# Patient Record
Sex: FEMALE | Race: WHITE | NOT HISPANIC OR LATINO | Employment: FULL TIME | URBAN - METROPOLITAN AREA
[De-identification: names, ages, dates, MRNs, and addresses within clinical notes are randomized per-mention and may not be internally consistent; named-entity substitution may affect disease eponyms.]

---

## 2017-01-03 ENCOUNTER — APPOINTMENT (OUTPATIENT)
Dept: CT IMAGING | Facility: HOSPITAL | Age: 27
End: 2017-01-03

## 2017-01-03 ENCOUNTER — HOSPITAL ENCOUNTER (EMERGENCY)
Facility: HOSPITAL | Age: 27
Discharge: HOME | End: 2017-01-03
Attending: EMERGENCY MEDICINE | Admitting: EMERGENCY MEDICINE

## 2017-01-03 VITALS
OXYGEN SATURATION: 95 % | DIASTOLIC BLOOD PRESSURE: 53 MMHG | TEMPERATURE: 98.9 F | BODY MASS INDEX: 18.83 KG/M2 | SYSTOLIC BLOOD PRESSURE: 99 MMHG | RESPIRATION RATE: 18 BRPM | HEART RATE: 80 BPM | WEIGHT: 120 LBS | HEIGHT: 67 IN

## 2017-01-03 DIAGNOSIS — J20.9 ACUTE BRONCHITIS, UNSPECIFIED ORGANISM: ICD-10-CM

## 2017-01-03 DIAGNOSIS — K52.9 GASTROENTERITIS, ACUTE: Primary | ICD-10-CM

## 2017-01-03 LAB
ALBUMIN SERPL-MCNC: 4.8 G/DL (ref 3.2–4.8)
ALBUMIN/GLOB SERPL: 1.6 G/DL (ref 1.5–2.5)
ALP SERPL-CCNC: 57 U/L (ref 25–100)
ALT SERPL W P-5'-P-CCNC: 16 U/L (ref 7–40)
ANION GAP SERPL CALCULATED.3IONS-SCNC: 7 MMOL/L (ref 3–11)
AST SERPL-CCNC: 23 U/L (ref 0–33)
B-HCG UR QL: NEGATIVE
BACTERIA UR QL AUTO: ABNORMAL /HPF
BASOPHILS # BLD AUTO: 0.02 10*3/MM3 (ref 0–0.2)
BASOPHILS NFR BLD AUTO: 0.2 % (ref 0–1)
BILIRUB SERPL-MCNC: 0.9 MG/DL (ref 0.3–1.2)
BILIRUB UR QL STRIP: NEGATIVE
BUN BLD-MCNC: 7 MG/DL (ref 9–23)
BUN/CREAT SERPL: 11.7 (ref 7–25)
CALCIUM SPEC-SCNC: 9.4 MG/DL (ref 8.7–10.4)
CHLORIDE SERPL-SCNC: 105 MMOL/L (ref 99–109)
CLARITY UR: CLEAR
CO2 SERPL-SCNC: 25 MMOL/L (ref 20–31)
COLOR UR: YELLOW
CREAT BLD-MCNC: 0.6 MG/DL (ref 0.6–1.3)
DEPRECATED RDW RBC AUTO: 41.6 FL (ref 37–54)
EOSINOPHIL # BLD AUTO: 0.16 10*3/MM3 (ref 0.1–0.3)
EOSINOPHIL NFR BLD AUTO: 1.4 % (ref 0–3)
ERYTHROCYTE [DISTWIDTH] IN BLOOD BY AUTOMATED COUNT: 12.7 % (ref 11.3–14.5)
GFR SERPL CREATININE-BSD FRML MDRD: 123 ML/MIN/1.73
GLOBULIN UR ELPH-MCNC: 3 GM/DL
GLUCOSE BLD-MCNC: 111 MG/DL (ref 70–100)
GLUCOSE UR STRIP-MCNC: NEGATIVE MG/DL
HCG INTACT+B SERPL-ACNC: <5 MIU/ML
HCT VFR BLD AUTO: 42.9 % (ref 34.5–44)
HGB BLD-MCNC: 14.6 G/DL (ref 11.5–15.5)
HGB UR QL STRIP.AUTO: NEGATIVE
HOLD SPECIMEN: NORMAL
HOLD SPECIMEN: NORMAL
HYALINE CASTS UR QL AUTO: ABNORMAL /LPF
IMM GRANULOCYTES # BLD: 0.03 10*3/MM3 (ref 0–0.03)
IMM GRANULOCYTES NFR BLD: 0.3 % (ref 0–0.6)
INTERNAL NEGATIVE CONTROL: NEGATIVE
INTERNAL POSITIVE CONTROL: POSITIVE
KETONES UR QL STRIP: ABNORMAL
LEUKOCYTE ESTERASE UR QL STRIP.AUTO: ABNORMAL
LIPASE SERPL-CCNC: 20 U/L (ref 6–51)
LYMPHOCYTES # BLD AUTO: 0.38 10*3/MM3 (ref 0.6–4.8)
LYMPHOCYTES NFR BLD AUTO: 3.3 % (ref 24–44)
Lab: NORMAL
MCH RBC QN AUTO: 30.4 PG (ref 27–31)
MCHC RBC AUTO-ENTMCNC: 34 G/DL (ref 32–36)
MCV RBC AUTO: 89.4 FL (ref 80–99)
MONOCYTES # BLD AUTO: 0.88 10*3/MM3 (ref 0–1)
MONOCYTES NFR BLD AUTO: 7.6 % (ref 0–12)
NEUTROPHILS # BLD AUTO: 10.11 10*3/MM3 (ref 1.5–8.3)
NEUTROPHILS NFR BLD AUTO: 87.2 % (ref 41–71)
NITRITE UR QL STRIP: NEGATIVE
PH UR STRIP.AUTO: 6.5 [PH] (ref 5–8)
PLATELET # BLD AUTO: 202 10*3/MM3 (ref 150–450)
PMV BLD AUTO: 10.2 FL (ref 6–12)
POTASSIUM BLD-SCNC: 3.4 MMOL/L (ref 3.5–5.5)
PROT SERPL-MCNC: 7.8 G/DL (ref 5.7–8.2)
PROT UR QL STRIP: NEGATIVE
RBC # BLD AUTO: 4.8 10*6/MM3 (ref 3.89–5.14)
RBC # UR: ABNORMAL /HPF
REF LAB TEST METHOD: ABNORMAL
SODIUM BLD-SCNC: 137 MMOL/L (ref 132–146)
SP GR UR STRIP: 1.02 (ref 1–1.03)
SQUAMOUS #/AREA URNS HPF: ABNORMAL /HPF
UROBILINOGEN UR QL STRIP: ABNORMAL
WBC NRBC COR # BLD: 11.58 10*3/MM3 (ref 3.5–10.8)
WBC UR QL AUTO: ABNORMAL /HPF
WHOLE BLOOD HOLD SPECIMEN: NORMAL
WHOLE BLOOD HOLD SPECIMEN: NORMAL

## 2017-01-03 PROCEDURE — 96376 TX/PRO/DX INJ SAME DRUG ADON: CPT

## 2017-01-03 PROCEDURE — 99284 EMERGENCY DEPT VISIT MOD MDM: CPT

## 2017-01-03 PROCEDURE — 84702 CHORIONIC GONADOTROPIN TEST: CPT | Performed by: NURSE PRACTITIONER

## 2017-01-03 PROCEDURE — 80053 COMPREHEN METABOLIC PANEL: CPT | Performed by: EMERGENCY MEDICINE

## 2017-01-03 PROCEDURE — 96361 HYDRATE IV INFUSION ADD-ON: CPT

## 2017-01-03 PROCEDURE — 96374 THER/PROPH/DIAG INJ IV PUSH: CPT

## 2017-01-03 PROCEDURE — 96375 TX/PRO/DX INJ NEW DRUG ADDON: CPT

## 2017-01-03 PROCEDURE — 25010000002 HYDROMORPHONE PER 4 MG: Performed by: NURSE PRACTITIONER

## 2017-01-03 PROCEDURE — 74176 CT ABD & PELVIS W/O CONTRAST: CPT

## 2017-01-03 PROCEDURE — 85025 COMPLETE CBC W/AUTO DIFF WBC: CPT | Performed by: EMERGENCY MEDICINE

## 2017-01-03 PROCEDURE — 87086 URINE CULTURE/COLONY COUNT: CPT | Performed by: EMERGENCY MEDICINE

## 2017-01-03 PROCEDURE — 83690 ASSAY OF LIPASE: CPT | Performed by: EMERGENCY MEDICINE

## 2017-01-03 PROCEDURE — 94640 AIRWAY INHALATION TREATMENT: CPT

## 2017-01-03 PROCEDURE — 25010000002 ONDANSETRON PER 1 MG: Performed by: NURSE PRACTITIONER

## 2017-01-03 PROCEDURE — 81001 URINALYSIS AUTO W/SCOPE: CPT | Performed by: EMERGENCY MEDICINE

## 2017-01-03 RX ORDER — ONDANSETRON 4 MG/1
4 TABLET, ORALLY DISINTEGRATING ORAL EVERY 8 HOURS PRN
Qty: 12 TABLET | Refills: 0 | Status: SHIPPED | OUTPATIENT
Start: 2017-01-03 | End: 2017-02-14

## 2017-01-03 RX ORDER — PROMETHAZINE HYDROCHLORIDE 25 MG/1
25 TABLET ORAL EVERY 6 HOURS PRN
Qty: 12 TABLET | Refills: 0 | Status: SHIPPED | OUTPATIENT
Start: 2017-01-03 | End: 2017-01-18

## 2017-01-03 RX ORDER — SODIUM CHLORIDE 0.9 % (FLUSH) 0.9 %
10 SYRINGE (ML) INJECTION AS NEEDED
Status: DISCONTINUED | OUTPATIENT
Start: 2017-01-03 | End: 2017-01-03 | Stop reason: HOSPADM

## 2017-01-03 RX ORDER — ONDANSETRON 2 MG/ML
4 INJECTION INTRAMUSCULAR; INTRAVENOUS ONCE
Status: COMPLETED | OUTPATIENT
Start: 2017-01-03 | End: 2017-01-03

## 2017-01-03 RX ORDER — AZITHROMYCIN 250 MG/1
TABLET, FILM COATED ORAL
Qty: 6 TABLET | Refills: 0 | Status: SHIPPED | OUTPATIENT
Start: 2017-01-03 | End: 2017-01-09

## 2017-01-03 RX ORDER — HYDROMORPHONE HYDROCHLORIDE 1 MG/ML
0.5 INJECTION, SOLUTION INTRAMUSCULAR; INTRAVENOUS; SUBCUTANEOUS ONCE
Status: COMPLETED | OUTPATIENT
Start: 2017-01-03 | End: 2017-01-03

## 2017-01-03 RX ORDER — DEXTROMETHORPHAN HYDROBROMIDE AND PROMETHAZINE HYDROCHLORIDE 15; 6.25 MG/5ML; MG/5ML
5 SYRUP ORAL 4 TIMES DAILY PRN
Qty: 60 ML | Refills: 0 | Status: SHIPPED | OUTPATIENT
Start: 2017-01-03 | End: 2017-01-18

## 2017-01-03 RX ADMIN — HYDROMORPHONE HYDROCHLORIDE 0.5 MG: 1 INJECTION, SOLUTION INTRAMUSCULAR; INTRAVENOUS; SUBCUTANEOUS at 08:03

## 2017-01-03 RX ADMIN — HYDROMORPHONE HYDROCHLORIDE 1 MG: 1 INJECTION, SOLUTION INTRAMUSCULAR; INTRAVENOUS; SUBCUTANEOUS at 09:55

## 2017-01-03 RX ADMIN — ALBUTEROL SULFATE 2.5 MG: 2.5 SOLUTION RESPIRATORY (INHALATION) at 09:22

## 2017-01-03 RX ADMIN — ONDANSETRON 4 MG: 2 INJECTION INTRAMUSCULAR; INTRAVENOUS at 08:03

## 2017-01-03 RX ADMIN — SODIUM CHLORIDE 1000 ML: 9 INJECTION, SOLUTION INTRAVENOUS at 08:03

## 2017-01-03 NOTE — ED PROVIDER NOTES
Subjective   Patient is a 26 y.o. female presenting with abdominal pain.   History provided by:  Patient  Abdominal Pain   Pain location:  LLQ  Pain quality: cramping and sharp    Pain radiates to:  Does not radiate  Pain severity:  Moderate  Onset quality:  Gradual  Duration:  4 days  Timing:  Constant  Progression:  Unchanged  Chronicity:  New  Relieved by:  Nothing  Worsened by:  Eating  Ineffective treatments:  None tried  Associated symptoms: chills, diarrhea, dysuria, fatigue, nausea and vomiting    Associated symptoms: no fever, no hematuria, no melena, no vaginal bleeding and no vaginal discharge        Review of Systems   Constitutional: Positive for chills and fatigue. Negative for fever.   Gastrointestinal: Positive for abdominal pain, diarrhea, nausea and vomiting. Negative for melena.   Genitourinary: Positive for dysuria, frequency and urgency. Negative for hematuria, vaginal bleeding and vaginal discharge.   Neurological: Negative for dizziness and light-headedness.   All other systems reviewed and are negative.      Past Medical History   Diagnosis Date   • Anxiety        Allergies   Allergen Reactions   • Klonopin [Clonazepam] Other (See Comments)     Tingling tongue.        Past Surgical History   Procedure Laterality Date   •  section         Family History   Problem Relation Age of Onset   • Bipolar disorder Mother    • Panic disorder Mother        Social History     Social History   • Marital status:      Spouse name: N/A   • Number of children: N/A   • Years of education: N/A     Social History Main Topics   • Smoking status: Current Every Day Smoker     Packs/day: 1.00   • Smokeless tobacco: None   • Alcohol use No   • Drug use: No   • Sexual activity: Defer     Other Topics Concern   • None     Social History Narrative   • None           Objective   Physical Exam   Constitutional: She is oriented to person, place, and time. She appears well-developed and well-nourished.   HENT:    Right Ear: External ear normal.   Left Ear: External ear normal.   Mouth/Throat: Oropharynx is clear and moist.   Neck: Normal range of motion. Neck supple.   Cardiovascular: Regular rhythm and normal heart sounds.  Tachycardia present.    Pulmonary/Chest: Effort normal. No respiratory distress. She has wheezes (right sided). She has no rales. She exhibits no tenderness.   Abdominal: Soft. Bowel sounds are normal. There is tenderness (mod LLQ ). There is no rebound and no guarding.   Musculoskeletal: Normal range of motion.   Neurological: She is alert and oriented to person, place, and time.   Skin: Skin is warm and dry.   Psychiatric: She has a normal mood and affect. Her behavior is normal.   Vitals reviewed.      Procedures         ED Course  ED Course           Course of Care      Lab Results (last 24 hours)     ** No results found for the last 24 hours. **          Note: In addition to lab results from this visit, the labs listed above may include labs taken at another facility or during a different encounter within the last 24 hours. Please correlate lab times with ED admission and discharge times for further clarification of the services performed during this visit.    CT Abdomen Pelvis Without Contrast   Final Result   Findings to suggest possible mild gastroenteritis.       D:  01/03/2017   E:  01/03/2017           This report was finalized on 1/3/2017 2:01 PM by Dr. Fallon Archer MD.              Vitals:    01/03/17 0739 01/03/17 0800 01/03/17 0922 01/03/17 1000   BP: 104/73 91/79  99/53   BP Location: Left arm      Patient Position: Lying      Pulse: 113  72 80   Resp: 18  18    Temp: 98.9 °F (37.2 °C)      TempSrc: Oral      SpO2: 98% 99% 98% 95%   Weight:       Height:           Medications   sodium chloride 0.9 % bolus 1,000 mL (0 mL Intravenous Stopped 1/3/17 1010)   ondansetron (ZOFRAN) injection 4 mg (4 mg Intravenous Given 1/3/17 0803)   HYDROmorphone (DILAUDID) injection 0.5 mg (0.5 mg  Intravenous Given 1/3/17 0803)   albuterol (PROVENTIL) nebulizer solution 2.5 mg (2.5 mg Nebulization Given 1/3/17 0922)   HYDROmorphone (DILAUDID) injection 1 mg (1 mg Intravenous Given 1/3/17 0955)       ECG/EMG Results (last 24 hours)     ** No results found for the last 24 hours. **        CT ABD/Pelv: findings suggestive of mild gastroenteritis per radiology voice clip.            [facility]    Final diagnoses:   Gastroenteritis, acute   Acute bronchitis, unspecified organism            Yessy Mar, APRN  01/07/17 1413

## 2017-01-05 LAB — BACTERIA SPEC AEROBE CULT: NORMAL

## 2017-01-06 ENCOUNTER — TELEPHONE (OUTPATIENT)
Dept: FAMILY MEDICINE CLINIC | Facility: CLINIC | Age: 27
End: 2017-01-06

## 2017-01-06 NOTE — TELEPHONE ENCOUNTER
----- Message from June Whitten MA sent at 1/6/2017  8:03 AM EST -----  Regarding: FW: PA   I did a pa on 12/30. Have not received anything back. Sorry just seen this message.  ----- Message -----     From: Rossana Montes MA     Sent: 12/30/2016  11:30 AM       To: June Whitten MA  Subject: FW: PA                                           Have you seen this one in your pile?  ----- Message -----     From: Alba Campos     Sent: 12/30/2016  10:56 AM       To: [facility]  Subject: PA                                               PLEASE CALL INSURANCE CO TO GET A PA ON THE TOPICAL CREAM FOR SCABIES. RX TOLD HER THE INSURANCE ONLY PAID FOR ONE TREATMENT EVERY SIX MONTHS, SO SHE WILL NEED A PA AND RX ALSO SAID SHE WOULD NEED AL LEAST THREE TREATMENTS.

## 2017-01-07 DIAGNOSIS — Z87.898 HISTORY OF HEADACHE: ICD-10-CM

## 2017-01-09 ENCOUNTER — OFFICE VISIT (OUTPATIENT)
Dept: FAMILY MEDICINE CLINIC | Facility: CLINIC | Age: 27
End: 2017-01-09

## 2017-01-09 VITALS
HEIGHT: 67 IN | DIASTOLIC BLOOD PRESSURE: 62 MMHG | SYSTOLIC BLOOD PRESSURE: 98 MMHG | BODY MASS INDEX: 19.68 KG/M2 | WEIGHT: 125.4 LBS | HEART RATE: 80 BPM

## 2017-01-09 DIAGNOSIS — K59.1 FUNCTIONAL DIARRHEA: ICD-10-CM

## 2017-01-09 DIAGNOSIS — L30.9 DERMATITIS: ICD-10-CM

## 2017-01-09 DIAGNOSIS — R10.11 ABDOMINAL PAIN, RIGHT UPPER QUADRANT: Primary | ICD-10-CM

## 2017-01-09 PROCEDURE — 99213 OFFICE O/P EST LOW 20 MIN: CPT | Performed by: PHYSICIAN ASSISTANT

## 2017-01-09 RX ORDER — DICYCLOMINE HCL 20 MG
TABLET ORAL
Qty: 30 TABLET | Refills: 0 | Status: SHIPPED | OUTPATIENT
Start: 2017-01-09 | End: 2017-02-14

## 2017-01-09 NOTE — MR AVS SNAPSHOT
Pretty Merritt   1/9/2017 2:45 PM   Office Visit    Dept Phone:  [phone]   Encounter #:  [id number]    Provider:  Annelise Woodard PA-C   Department:  [facility] FAM[facility] MEDICINE                Your Full Care Plan              Today's Medication Changes          These changes are accurate as of: 1/9/17  3:37 PM.  If you have any questions, ask your nurse or doctor.               New Medication(s)Ordered:     dicyclomine 20 MG tablet   Commonly known as:  BENTYL   Take before each meal, try to take 30 minutes before meals if possible.         Stop taking medication(s)listed here:     azithromycin 250 MG tablet   Commonly known as:  ZITHROMAX           fluconazole 150 MG tablet   Commonly known as:  DIFLUCAN                Where to Get Your Medications      These medications were sent to [email or web address] [address] [place] [address] [address] [facility] [address] - [phone] [facility] [phone]   [address] [facility] [facility] [address]     Phone:  [phone]     dicyclomine 20 MG tablet                  Your Updated Medication List          This list is accurate as of: 1/9/17  3:37 PM.  Always use your most recent med list.                albuterol 108 (90 BASE) MCG/ACT inhaler   Commonly known as:  PROVENTIL HFA;VENTOLIN HFA   Inhale 2 puffs Every 4 (Four) Hours As Needed for wheezing.       ALPRAZolam 0.5 MG tablet   Commonly known as:  XANAX   Take 1 tablet by mouth 3 (Three) Times a Day As Needed for anxiety.       diclofenac 50 MG EC tablet   Commonly known as:  VOLTAREN   TAKE 1 TABLET BY MOUTH TWICE DAILY AS NEEDED FOR HEADACHE       dicyclomine 20 MG tablet   Commonly known as:  BENTYL   Take before each meal, try to take 30 minutes before meals if possible.       fluticasone-salmeterol 250-50 MCG/DOSE DISKUS   Commonly known as:  ADVAIR   Inhale 1 puff 2 (Two) Times a Day.       fluvoxaMINE 100 MG tablet   Commonly  known as:  LUVOX       hydrOXYzine 25 MG tablet   Commonly known as:  ATARAX   TAKE 1 TABLET BY MOUTH EVERY 8 HOURS AS NEEDED FOR ITCHING       ketoconazole 2 % cream   Commonly known as:  NIZORAL   Apply  topically Every 12 (Twelve) Hours. To rash       levonorgestrel-ethinyl estradiol 0.1-20 MG-MCG per tablet   Commonly known as:  AVIANE,ALESSE,LESSINA   Take 1 tablet by mouth Daily.       methocarbamol 750 MG tablet   Commonly known as:  ROBAXIN       ondansetron ODT 4 MG disintegrating tablet   Commonly known as:  ZOFRAN-ODT   Take 1 tablet by mouth Every 8 (Eight) Hours As Needed for nausea or vomiting.       permethrin 5 % cream   Commonly known as:  ELIMITE   Apply  topically See Admin Instructions. To be used from neck to toes.       promethazine 25 MG tablet   Commonly known as:  PHENERGAN   Take 1 tablet by mouth Every 6 (Six) Hours As Needed for nausea or vomiting.       promethazine-dextromethorphan 6.25-15 MG/5ML syrup   Commonly known as:  PROMETHAZINE-DM   Take 5 mL by mouth 4 (Four) Times a Day As Needed for cough.       tiZANidine 4 MG tablet   Commonly known as:  ZANAFLEX   Take one at night for headaches and neck pain       triamcinolone 0.1 % cream   Commonly known as:  KENALOG   Apply  topically 2 (Two) Times a Day.               We Performed the Following     Ambulatory Referral to Dermatology       You Were Diagnosed With        Codes Comments    Abdominal pain, right upper quadrant    -  Primary ICD-10-CM: R10.11  ICD-9-CM: 789.01     Functional diarrhea     ICD-10-CM: K59.1  ICD-9-CM: 564.5     Dermatitis     ICD-10-CM: L30.9  ICD-9-CM: 692.9       Instructions     None    Patient Instructions History      Upcoming Appointments     Visit Type Date Time Department    OFFICE VISIT 1/9/2017  2:45 PM [facility]    OFFICE VISIT 2/17/2017  2:00 PM [facility]      MyChart Signup     [facility] [employer] allows you to send messages to your doctor, view your test results, renew your  "prescriptions, schedule appointments, and more. To sign up, go to [email or web address] and click on the Sign Up Now link in the New User? box. Enter your [employer] Activation Code exactly as it appears below along with the last four digits of your Social Security Number and your Date of Birth () to complete the sign-up process. If you do not sign up before the expiration date, you must request a new code.    [employer] Activation Code: [email or web address]  Expires: 2017  3:36 PM    If you have questions, you can email [email or web address] or call [phone] to talk to our [employer] staff. Remember, [employer] is NOT to be used for urgent needs. For medical emergencies, dial 911.               Other Info from Your Visit           Your Appointments     2017  2:00 PM EST   Office Visit with Annelise Woodard PA-C   [facility] FAMILY MEDICINE (--)    [address] [place] [id number] [phone]           Arrive 15 minutes prior to appointment.              Allergies     Klonopin [Clonazepam]  Other (See Comments)    Tingling tongue.       Reason for Visit     GI Problem     Possible Pregnancy           Vital Signs     Blood Pressure Pulse Height Weight Body Mass Index Smoking Status    98/62 (BP Location: Left arm, Patient Position: Sitting, Cuff Size: Adult) 80 67\" (170.2 cm) 125 lb 6.4 oz (56.9 kg) 19.64 kg/m2 Current Every Day Smoker      Problems and Diagnoses Noted     Abdominal pain, right upper quadrant    -  Primary    Functional diarrhea        Dermatitis            "

## 2017-01-09 NOTE — PROGRESS NOTES
Subjective   Pretty Merritt is a 26 y.o. female    History of Present Illness  Patient presents today for evaluation of ongoing gastrointestinal problems that started last week.  She states that she's having diarrhea after each meal and has vomited several times as well.  She was seen in the emergency department 6 days ago and had a negative pregnancy test, normal labs, normal urine and normal CT scan of abdomen and pelvis.  She is diagnosed with viral gastroenteritis and given Zofran.  She states the vomiting has improved but she is still having diarrhea each time she eats.  Her weight is stable.  She is consuming fluids fine, only having some diarrhea after coffee.  She states she is concerned about her gallbladder as her mother mentioned this to her that it could be causing her problems.  She has been expressing some right upper quadrant abdominal pain after meals.  Additionally she is continuing to have a recurrence of her chronic dermatitis, see multiple previous office notes regarding treatment of this.  She would like see dermatologist.  The following portions of the patient's history were reviewed and updated as appropriate: allergies, current medications, past social history and problem list    Review of Systems   Constitutional: Negative for appetite change, chills, fever and unexpected weight change.   Respiratory: Negative for cough, chest tightness and shortness of breath.    Cardiovascular: Negative for chest pain.   Gastrointestinal: Positive for abdominal pain, diarrhea and nausea. Negative for blood in stool, constipation and vomiting.   Genitourinary: Negative for difficulty urinating and dysuria.   Musculoskeletal: Negative for back pain.   Skin: Positive for rash (Episodic, recurrent rash ×4 weeks on legs and trunk). Negative for color change.   Allergic/Immunologic: Negative for food allergies.       Objective     Vitals:    01/09/17 1511   BP: 98/62   Pulse: 80       Physical Exam    Constitutional: She is oriented to person, place, and time. She appears well-developed and well-nourished. No distress.   Eyes: Conjunctivae are normal.   Cardiovascular: Normal rate and regular rhythm.    Pulmonary/Chest: Effort normal and breath sounds normal.   Abdominal: Soft. Bowel sounds are normal. She exhibits no distension and no mass. There is tenderness (minimal tenderness right upper quadrant of abdomen). There is no rebound and no guarding. No hernia.   Neurological: She is alert and oriented to person, place, and time.   Skin: Skin is warm and dry. No rash noted. She is not diaphoretic. No erythema. No pallor.   Skin appears clear today   Psychiatric: She has a normal mood and affect. Her behavior is normal.   Nursing note and vitals reviewed.      Assessment/Plan     Diagnoses and all orders for this visit:    Abdominal pain, right upper quadrant  -     US Gallbladder; Future    Functional diarrhea  -     dicyclomine (BENTYL) 20 MG tablet; Take before each meal, try to take 30 minutes before meals if possible.    Dermatitis  -     Ambulatory Referral to Dermatology

## 2017-01-10 ENCOUNTER — TELEPHONE (OUTPATIENT)
Dept: FAMILY MEDICINE CLINIC | Facility: CLINIC | Age: 27
End: 2017-01-10

## 2017-01-10 RX ORDER — PERMETHRIN 50 MG/G
CREAM TOPICAL
Qty: 60 G | Refills: 0 | Status: SHIPPED | OUTPATIENT
Start: 2017-01-10 | End: 2017-01-18

## 2017-01-10 NOTE — TELEPHONE ENCOUNTER
Patient request refill on Hydroxyzine 25 mg tab.1 q 8 hrs as needed for itching.Last seen 1/09.  Patient is allergic to Klonopin.  Pharmacy is [address]

## 2017-01-10 NOTE — TELEPHONE ENCOUNTER
This is fine to call prescription in for hydroxyzine 25 mg tablets one every 8 hours as needed for itching dispensed #90

## 2017-01-13 ENCOUNTER — HOSPITAL ENCOUNTER (OUTPATIENT)
Dept: ULTRASOUND IMAGING | Facility: HOSPITAL | Age: 27
Discharge: HOME | End: 2017-01-13
Admitting: PHYSICIAN ASSISTANT

## 2017-01-13 ENCOUNTER — TELEPHONE (OUTPATIENT)
Dept: FAMILY MEDICINE CLINIC | Facility: CLINIC | Age: 27
End: 2017-01-13

## 2017-01-13 DIAGNOSIS — R10.11 ABDOMINAL PAIN, RIGHT UPPER QUADRANT: ICD-10-CM

## 2017-01-13 PROCEDURE — 76705 ECHO EXAM OF ABDOMEN: CPT

## 2017-01-13 NOTE — TELEPHONE ENCOUNTER
----- Message from Shu Mahajan sent at 1/12/2017 12:43 PM EST -----  Contact: PT.  PT. WANTED NICOL TO KNOW THAT SHE SPOKE MARIANO/CHRISTIAN, OUR REFERRAL CLERK, RE. SCABIES ISSUES TO REFER TO DERMATOLOGIST OFFICE.  PT. WANTED TABITHA TO KNOW THIS INFO.

## 2017-01-16 ENCOUNTER — TELEPHONE (OUTPATIENT)
Dept: FAMILY MEDICINE CLINIC | Facility: CLINIC | Age: 27
End: 2017-01-16

## 2017-01-16 NOTE — TELEPHONE ENCOUNTER
----- Message from Alba Campos sent at 1/16/2017 11:10 AM EST -----  Contact: patient  She would like for someone to call her with results of ultra sound that was done last week. Thank you   [phone]

## 2017-01-16 NOTE — TELEPHONE ENCOUNTER
"Pt has been notified. Pt says she is still having the same pain in her lower abdm and low back, especially when she eats. Pt says she has a lot of gas, diarrhea, and has even noticed \"white (not clear)\" discharge. Pt also states that she still has not had a period yet either.  "

## 2017-01-16 NOTE — TELEPHONE ENCOUNTER
Patient needs to make another appointment in the office for further evaluation to determine future treatment.

## 2017-01-16 NOTE — TELEPHONE ENCOUNTER
----- Message from Annelise Woodard PA-C sent at 1/16/2017  7:17 AM EST -----  Please notify patient that her ultrasound of her gallbladder was normal.  Let her know that it also showed her liver and right kidney to be normal.

## 2017-01-17 DIAGNOSIS — J40 BRONCHITIS: ICD-10-CM

## 2017-01-18 ENCOUNTER — OFFICE VISIT (OUTPATIENT)
Dept: FAMILY MEDICINE CLINIC | Facility: CLINIC | Age: 27
End: 2017-01-18

## 2017-01-18 VITALS
SYSTOLIC BLOOD PRESSURE: 102 MMHG | BODY MASS INDEX: 19.93 KG/M2 | WEIGHT: 127 LBS | HEART RATE: 78 BPM | HEIGHT: 67 IN | DIASTOLIC BLOOD PRESSURE: 68 MMHG

## 2017-01-18 DIAGNOSIS — N91.2 AMENORRHEA: ICD-10-CM

## 2017-01-18 DIAGNOSIS — Z3A.01 LESS THAN 8 WEEKS GESTATION OF PREGNANCY: Primary | ICD-10-CM

## 2017-01-18 LAB
B-HCG UR QL: POSITIVE
INTERNAL NEGATIVE CONTROL: NEGATIVE
INTERNAL POSITIVE CONTROL: POSITIVE
Lab: ABNORMAL

## 2017-01-18 PROCEDURE — 81025 URINE PREGNANCY TEST: CPT | Performed by: PHYSICIAN ASSISTANT

## 2017-01-18 PROCEDURE — 99213 OFFICE O/P EST LOW 20 MIN: CPT | Performed by: PHYSICIAN ASSISTANT

## 2017-01-18 RX ORDER — DEXTROMETHORPHAN HYDROBROMIDE AND PROMETHAZINE HYDROCHLORIDE 15; 6.25 MG/5ML; MG/5ML
SYRUP ORAL
Qty: 120 ML | Refills: 0 | OUTPATIENT
Start: 2017-01-18

## 2017-01-18 NOTE — PROGRESS NOTES
Subjective   Pretty Merritt is a 26 y.o. female    History of Present Illness  Patient presents today complaining of feeling fatigue, nausea and mild abdominal cramping for the past 2 weeks.  She states her menstrual cycle was 2 weeks late.  She states her appetite has increased more to where she is wanting the breakfast which she typically does not want to do.  She has been pregnant 3 times the past and she states this is the way she has felt when she is pregnant.  She would like to have a pregnancy test.  The following portions of the patient's history were reviewed and updated as appropriate: allergies, current medications, past social history and problem list    Review of Systems   Constitutional: Positive for fatigue. Negative for fever.   Gastrointestinal: Positive for abdominal pain and nausea. Negative for constipation, diarrhea and vomiting.   Genitourinary: Positive for menstrual problem (menses 2 weeks late), pelvic pain (mild pelvic pressure bilaterally) and vaginal discharge (white).       Objective     Vitals:    01/18/17 1510   BP: 102/68   Pulse: 78       Physical Exam   Constitutional: She is oriented to person, place, and time. She appears well-developed and well-nourished.   Eyes: Conjunctivae are normal.   Cardiovascular: Normal rate and regular rhythm.    Pulmonary/Chest: Effort normal and breath sounds normal.   Abdominal: Soft. Bowel sounds are normal. She exhibits no distension and no mass. There is no tenderness. There is no rebound and no guarding. No hernia.   Neurological: She is alert and oriented to person, place, and time.   Skin: Skin is warm and dry. No rash noted. No erythema.   Psychiatric: She has a normal mood and affect. Her behavior is normal.   Nursing note and vitals reviewed.   results of pregnancy test positive, discussed with patient today, prescription given for prenatal vitamins and referred to gynecology, reviewed all medications with patient and instructed her to  discontinue all medicines except dicyclomine which she can take on an as-needed basis and to wean down off of Xanax taking a half a tablet a day through the weekend and discontinuing it.  Strongly encouraged smoking cessation, patient states she'll stop smoking today.    Assessment/Plan     Diagnoses and all orders for this visit:    Less than 8 weeks gestation of pregnancy  -     Ambulatory Referral to Gynecology    Amenorrhea

## 2017-01-18 NOTE — MR AVS SNAPSHOT
Pretty Merritt   1/18/2017 2:45 PM   Office Visit    Dept Phone:  [phone]   Encounter #:  [id number]    Provider:  Annelise Woodard PA-C   Department:  [facility] FAMILY MEDICINE                Your Full Care Plan              Today's Medication Changes          These changes are accurate as of: 1/18/17  3:42 PM.  If you have any questions, ask your nurse or doctor.               Stop taking medication(s)listed here:     diclofenac 50 MG EC tablet   Commonly known as:  VOLTAREN   Stopped by:  Annelise Woodard PA-C           fluvoxaMINE 100 MG tablet   Commonly known as:  LUVOX   Stopped by:  Annelise Woodard PA-C           hydrOXYzine 25 MG tablet   Commonly known as:  ATARAX   Stopped by:  Annelise Woodard PA-C           levonorgestrel-ethinyl estradiol 0.1-20 MG-MCG per tablet   Commonly known as:  AVIANE,ALESSE,LESSINA   Stopped by:  Annelise Woodard PA-C           methocarbamol 750 MG tablet   Commonly known as:  ROBAXIN   Stopped by:  Annelise Woodard PA-C           permethrin 5 % cream   Commonly known as:  ELIMITE   Stopped by:  Annelise Woodard PA-C           promethazine 25 MG tablet   Commonly known as:  PHENERGAN   Stopped by:  Annelise Woodard PA-C           promethazine-dextromethorphan 6.25-15 MG/5ML syrup   Commonly known as:  PROMETHAZINE-DM   Stopped by:  Annelise Woodard PA-C           tiZANidine 4 MG tablet   Commonly known as:  ZANAFLEX   Stopped by:  Annelise Woodard PA-C                      Your Updated Medication List          This list is accurate as of: 1/18/17  3:42 PM.  Always use your most recent med list.                albuterol 108 (90 BASE) MCG/ACT inhaler   Commonly known as:  PROVENTIL HFA;VENTOLIN HFA   Inhale 2 puffs Every 4 (Four) Hours As Needed for wheezing.       ALPRAZolam 0.5 MG tablet   Commonly known as:  XANAX   Take 1 tablet by mouth 3 (Three) Times a Day As Needed for anxiety.       dicyclomine 20 MG  tablet   Commonly known as:  BENTYL   Take before each meal, try to take 30 minutes before meals if possible.       fluticasone-salmeterol 250-50 MCG/DOSE DISKUS   Commonly known as:  ADVAIR   Inhale 1 puff 2 (Two) Times a Day.       ketoconazole 2 % cream   Commonly known as:  NIZORAL   Apply  topically Every 12 (Twelve) Hours. To rash       ondansetron ODT 4 MG disintegrating tablet   Commonly known as:  ZOFRAN-ODT   Take 1 tablet by mouth Every 8 (Eight) Hours As Needed for nausea or vomiting.       triamcinolone 0.1 % cream   Commonly known as:  KENALOG   Apply  topically 2 (Two) Times a Day.               We Performed the Following     Ambulatory Referral to Gynecology       You Were Diagnosed With        Codes Comments    Less than 8 weeks gestation of pregnancy    -  Primary ICD-10-CM: Z3A.01  ICD-9-CM: V22.2       Instructions     None    Patient Instructions History      Upcoming Appointments     Visit Type Date Time Department    OFFICE VISIT 2017  2:45 PM [facility] [employer]    OFFICE VISIT 2017  2:00 PM [facility] [employer] Signup     [facility] [employer] allows you to send messages to your doctor, view your test results, renew your prescriptions, schedule appointments, and more. To sign up, go to [employer] and click on the Sign Up Now link in the New User? box. Enter your [employer] Activation Code exactly as it appears below along with the last four digits of your Social Security Number and your Date of Birth () to complete the sign-up process. If you do not sign up before the expiration date, you must request a new code.    [employer] Activation Code: [email or web address]  Expires: 2017  3:36 PM    If you have questions, you can email [email or web address] or call [phone] to talk to our [employer] staff. Remember, [employer] is NOT to be used for urgent needs. For medical emergencies, dial 911.               Other Info from Your Visit           Your Appointments   "   Feb 17, 2017  2:00 PM EST   Office Visit with Annelise Woodard PA-C   [facility] FAMILY MEDICINE (--)    [address] [place] [address] [address] [place] [id number] [phone]           Arrive 15 minutes prior to appointment.              Allergies     Klonopin [Clonazepam]  Other (See Comments)    Tingling tongue.       Reason for Visit     Abdominal Pain     Possible Pregnancy           Vital Signs     Blood Pressure Pulse Height Weight Body Mass Index Smoking Status    102/68 (BP Location: Right arm, Patient Position: Sitting, Cuff Size: Adult) 78 67\" (170.2 cm) 127 lb (57.6 kg) 19.89 kg/m2 Current Every Day Smoker      Problems and Diagnoses Noted     Less than 8 weeks gestation of pregnancy    -  Primary        "

## 2017-01-20 ENCOUNTER — APPOINTMENT (OUTPATIENT)
Dept: ULTRASOUND IMAGING | Facility: HOSPITAL | Age: 27
End: 2017-01-20

## 2017-01-20 ENCOUNTER — HOSPITAL ENCOUNTER (EMERGENCY)
Facility: HOSPITAL | Age: 27
Discharge: HOME | End: 2017-01-20
Attending: EMERGENCY MEDICINE | Admitting: EMERGENCY MEDICINE

## 2017-01-20 VITALS
DIASTOLIC BLOOD PRESSURE: 66 MMHG | OXYGEN SATURATION: 96 % | TEMPERATURE: 97.5 F | SYSTOLIC BLOOD PRESSURE: 102 MMHG | HEART RATE: 70 BPM | HEIGHT: 67 IN | RESPIRATION RATE: 16 BRPM | WEIGHT: 127 LBS | BODY MASS INDEX: 19.93 KG/M2

## 2017-01-20 DIAGNOSIS — O26.891 ABDOMINAL PAIN IN PREGNANCY, FIRST TRIMESTER: ICD-10-CM

## 2017-01-20 DIAGNOSIS — R10.9 ABDOMINAL PAIN IN PREGNANCY, FIRST TRIMESTER: ICD-10-CM

## 2017-01-20 DIAGNOSIS — O20.0 THREATENED MISCARRIAGE IN EARLY PREGNANCY: ICD-10-CM

## 2017-01-20 DIAGNOSIS — O20.9 VAGINAL BLEEDING IN PREGNANCY, FIRST TRIMESTER: Primary | ICD-10-CM

## 2017-01-20 LAB
ABO GROUP BLD: NORMAL
ANION GAP SERPL CALCULATED.3IONS-SCNC: 10 MMOL/L (ref 3–11)
BASOPHILS # BLD AUTO: 0.01 10*3/MM3 (ref 0–0.2)
BASOPHILS NFR BLD AUTO: 0.1 % (ref 0–1)
BILIRUB UR QL STRIP: NEGATIVE
BUN BLD-MCNC: 10 MG/DL (ref 9–23)
BUN/CREAT SERPL: 16.7 (ref 7–25)
CALCIUM SPEC-SCNC: 10.6 MG/DL (ref 8.7–10.4)
CHLORIDE SERPL-SCNC: 106 MMOL/L (ref 99–109)
CLARITY UR: CLEAR
CO2 SERPL-SCNC: 28 MMOL/L (ref 20–31)
COLOR UR: YELLOW
CREAT BLD-MCNC: 0.6 MG/DL (ref 0.6–1.3)
DEPRECATED RDW RBC AUTO: 42.4 FL (ref 37–54)
EOSINOPHIL # BLD AUTO: 0.1 10*3/MM3 (ref 0.1–0.3)
EOSINOPHIL NFR BLD AUTO: 1.5 % (ref 0–3)
ERYTHROCYTE [DISTWIDTH] IN BLOOD BY AUTOMATED COUNT: 12.9 % (ref 11.3–14.5)
GFR SERPL CREATININE-BSD FRML MDRD: 123 ML/MIN/1.73
GLUCOSE BLD-MCNC: 82 MG/DL (ref 70–100)
GLUCOSE UR STRIP-MCNC: NEGATIVE MG/DL
HCG INTACT+B SERPL-ACNC: 5644 MIU/ML
HCT VFR BLD AUTO: 43.5 % (ref 34.5–44)
HGB BLD-MCNC: 14.9 G/DL (ref 11.5–15.5)
HGB UR QL STRIP.AUTO: NEGATIVE
HOLD SPECIMEN: NORMAL
HOLD SPECIMEN: NORMAL
IMM GRANULOCYTES # BLD: 0.01 10*3/MM3 (ref 0–0.03)
IMM GRANULOCYTES NFR BLD: 0.1 % (ref 0–0.6)
KETONES UR QL STRIP: NEGATIVE
LEUKOCYTE ESTERASE UR QL STRIP.AUTO: NEGATIVE
LYMPHOCYTES # BLD AUTO: 1.88 10*3/MM3 (ref 0.6–4.8)
LYMPHOCYTES NFR BLD AUTO: 27.8 % (ref 24–44)
MCH RBC QN AUTO: 30.8 PG (ref 27–31)
MCHC RBC AUTO-ENTMCNC: 34.3 G/DL (ref 32–36)
MCV RBC AUTO: 89.9 FL (ref 80–99)
MONOCYTES # BLD AUTO: 0.55 10*3/MM3 (ref 0–1)
MONOCYTES NFR BLD AUTO: 8.1 % (ref 0–12)
NEUTROPHILS # BLD AUTO: 4.22 10*3/MM3 (ref 1.5–8.3)
NEUTROPHILS NFR BLD AUTO: 62.4 % (ref 41–71)
NITRITE UR QL STRIP: NEGATIVE
NUMBER OF DOSES: NORMAL
PH UR STRIP.AUTO: 6.5 [PH] (ref 5–8)
PLATELET # BLD AUTO: 323 10*3/MM3 (ref 150–450)
PMV BLD AUTO: 9.6 FL (ref 6–12)
POTASSIUM BLD-SCNC: 3.4 MMOL/L (ref 3.5–5.5)
PROT UR QL STRIP: NEGATIVE
RBC # BLD AUTO: 4.84 10*6/MM3 (ref 3.89–5.14)
RH BLD: POSITIVE
SODIUM BLD-SCNC: 144 MMOL/L (ref 132–146)
SP GR UR STRIP: 1.01 (ref 1–1.03)
UROBILINOGEN UR QL STRIP: NORMAL
WBC NRBC COR # BLD: 6.77 10*3/MM3 (ref 3.5–10.8)
WHOLE BLOOD HOLD SPECIMEN: NORMAL
WHOLE BLOOD HOLD SPECIMEN: NORMAL

## 2017-01-20 PROCEDURE — 93976 VASCULAR STUDY: CPT

## 2017-01-20 PROCEDURE — 84702 CHORIONIC GONADOTROPIN TEST: CPT | Performed by: EMERGENCY MEDICINE

## 2017-01-20 PROCEDURE — 80048 BASIC METABOLIC PNL TOTAL CA: CPT | Performed by: EMERGENCY MEDICINE

## 2017-01-20 PROCEDURE — 99284 EMERGENCY DEPT VISIT MOD MDM: CPT

## 2017-01-20 PROCEDURE — 86901 BLOOD TYPING SEROLOGIC RH(D): CPT

## 2017-01-20 PROCEDURE — 85025 COMPLETE CBC W/AUTO DIFF WBC: CPT | Performed by: EMERGENCY MEDICINE

## 2017-01-20 PROCEDURE — 86900 BLOOD TYPING SEROLOGIC ABO: CPT

## 2017-01-20 PROCEDURE — 81003 URINALYSIS AUTO W/O SCOPE: CPT | Performed by: PHYSICIAN ASSISTANT

## 2017-01-20 PROCEDURE — 76817 TRANSVAGINAL US OBSTETRIC: CPT

## 2017-01-20 RX ORDER — SODIUM CHLORIDE 0.9 % (FLUSH) 0.9 %
10 SYRINGE (ML) INJECTION AS NEEDED
Status: DISCONTINUED | OUTPATIENT
Start: 2017-01-20 | End: 2017-01-20 | Stop reason: HOSPADM

## 2017-01-20 NOTE — ED PROVIDER NOTES
Subjective   Patient is a 26 y.o. female presenting with pregnancy problem.   History provided by:  Patient  Pregnancy Problem   The current episode started today. Severity: Passing several small clots  Gestational age: Unknown    Primary symptoms include abdominal pain (cramping lower abdomen ) and vaginal bleeding. Patient reports no vaginal discharge. Prenatal care: Started prenatal vitamins. She had first positive urine pregnancy test at PCP's 2 days ago. No US to confirm pregnancy. Pt has appointment with OB Dr. Chaudhry next week.    Associated symptoms include no dysuria, no fever, no headaches, no light-headedness, no nausea, no shortness of breath, no syncope, no vomiting and no weakness.   Prior pregnancy complications include history of placenta previa and prior premature delivery (with death of infant at 19 days old per patient).       Review of Systems   Constitutional: Negative for chills and fever.   HENT: Negative for congestion, ear pain, sore throat and trouble swallowing.    Eyes: Negative for pain, redness and visual disturbance.   Respiratory: Negative for cough, chest tightness and shortness of breath.    Cardiovascular: Negative for chest pain, leg swelling and syncope.   Gastrointestinal: Positive for abdominal pain (cramping lower abdomen ). Negative for constipation, diarrhea, nausea and vomiting.   Genitourinary: Positive for vaginal bleeding. Negative for difficulty urinating, dysuria, flank pain, hematuria and vaginal discharge.   Musculoskeletal: Negative for arthralgias, back pain and joint swelling.   Skin: Negative for rash and wound.   Neurological: Negative for dizziness, syncope, speech difficulty, weakness, light-headedness, numbness and headaches.   Psychiatric/Behavioral: Negative for confusion.   All other systems reviewed and are negative.      Past Medical History   Diagnosis Date   • Anxiety        Allergies   Allergen Reactions   • Klonopin [Clonazepam] Other (See Comments)      Tingling tongue.        Past Surgical History   Procedure Laterality Date   •  section         Family History   Problem Relation Age of Onset   • Bipolar disorder Mother    • Panic disorder Mother        Social History     Social History   • Marital status:      Spouse name: N/A   • Number of children: N/A   • Years of education: N/A     Social History Main Topics   • Smoking status: Current Every Day Smoker     Packs/day: 1.00   • Smokeless tobacco: None   • Alcohol use No   • Drug use: No   • Sexual activity: Defer     Other Topics Concern   • None     Social History Narrative   • None           Objective   Physical Exam   Constitutional: She is oriented to person, place, and time. Vital signs are normal. She appears well-developed.   HENT:   Head: Atraumatic.   Nose: Nose normal.   Mouth/Throat: Mucous membranes are normal.   Eyes: Conjunctivae, EOM and lids are normal. Pupils are equal, round, and reactive to light.   Neck: Normal range of motion. Neck supple.   Cardiovascular: Normal rate, regular rhythm and normal heart sounds.    Pulmonary/Chest: Effort normal and breath sounds normal. She has no wheezes.   Abdominal: Soft. She exhibits no distension. There is tenderness (mild lower abdominal TTP). There is no rebound, no guarding and no CVA tenderness.   Musculoskeletal: Normal range of motion. She exhibits no edema or tenderness.   Neurological: She is alert and oriented to person, place, and time. No sensory deficit.   Skin: Skin is warm and dry. No rash noted. No erythema.   Psychiatric: She has a normal mood and affect. Her speech is normal and behavior is normal.   Nursing note and vitals reviewed.      Procedures         ED Course  ED Course    During course in ED patient became upset at the length of time for her results. Discussed results and need for close f/u with OB. Discussed risks of continued tobacco use. Offered pelvic exam and patient declined stating she wants to go home.      Course of Care      Lab Results (last 24 hours)     Procedure Component Value Units Date/Time    CBC & Differential [[id number]] Collected:  01/20/17 1014    Specimen:  Blood Updated:  01/20/17 1025    Narrative:       The following orders were created for panel order CBC & Differential.  Procedure                               Abnormality         Status                     ---------                               -----------         ------                     CBC Auto Differential[[id number]]         Normal              Final result                 Please view results for these tests on the individual orders.    Basic Metabolic Panel [[id number]]  (Abnormal) Collected:  01/20/17 1014    Specimen:  Blood Updated:  01/20/17 1049     Glucose 82 mg/dL      BUN 10 mg/dL      Creatinine 0.60 mg/dL      Sodium 144 mmol/L      Potassium 3.4 (L) mmol/L      Chloride 106 mmol/L      CO2 28.0 mmol/L      Calcium 10.6 (H) mg/dL      eGFR Non African Amer 123 mL/min/1.73      BUN/Creatinine Ratio 16.7      Anion Gap 10.0 mmol/L     Narrative:       [employer] Guidelines    Stage                           Description                             GFR                      1                               Normal or High                          90+  2                               Mild decrease                            60-89  3                               Moderate decrease                   30-59  4                               Severe decrease                       15-29  5                               Kidney failure                             <15    hCG, Quantitative, Pregnancy [[id number]] Collected:  01/20/17 1014    Specimen:  Blood Updated:  01/20/17 1118     HCG Quantitative 5644.00 mIU/mL     Narrative:       HCG Expected Values:     Nonpregnant females:               less than 5 mIU/mL     Males:                             less than 5 mIU/mL    Pregnant females:   0-1 Weeks Gestation                5-50    1-2 Weeks Gestation                   2-3 Weeks Gestation                100-5000   3-4 Weeks Gestation                [phone]  4-5 Weeks Gestation                [phone]   5-6 Weeks Gestation                [id number]   6-8 Weeks Gestation                [id number]   2-3 Months Gestation               [id number]      Note:  If a value is between 5-25 mIU/mL recommend            repeat testing and clinical correlation.    CBC Auto Differential [[id number]]  (Normal) Collected:  01/20/17 1014    Specimen:  Blood Updated:  01/20/17 1025     WBC 6.77 10*3/mm3      RBC 4.84 10*6/mm3      Hemoglobin 14.9 g/dL      Hematocrit 43.5 %      MCV 89.9 fL      MCH 30.8 pg      MCHC 34.3 g/dL      RDW 12.9 %      RDW-SD 42.4 fl      MPV 9.6 fL      Platelets 323 10*3/mm3      Neutrophil % 62.4 %      Lymphocyte % 27.8 %      Monocyte % 8.1 %      Eosinophil % 1.5 %      Basophil % 0.1 %      Immature Grans % 0.1 %      Neutrophils, Absolute 4.22 10*3/mm3      Lymphocytes, Absolute 1.88 10*3/mm3      Monocytes, Absolute 0.55 10*3/mm3      Eosinophils, Absolute 0.10 10*3/mm3      Basophils, Absolute 0.01 10*3/mm3      Immature Grans, Absolute 0.01 10*3/mm3     Urinalysis With / Culture If Indicated [[id number]]  (Normal) Collected:  01/20/17 1227    Specimen:  Urine from Urine, Clean Catch Updated:  01/20/17 1305     Color, UA Yellow      Appearance, UA Clear      pH, UA 6.5      Specific Gravity, UA 1.009      Glucose, UA Negative      Ketones, UA Negative      Bilirubin, UA Negative      Blood, UA Negative      Protein, UA Negative      Leuk Esterase, UA Negative      Nitrite, UA Negative      Urobilinogen, UA 0.2 E.U./dL     Narrative:       Urine microscopic not indicated.          Note: In addition to lab results from this visit, the labs listed above may include labs taken at another facility or during a different encounter within the last 24 hours. Please correlate lab times with ED admission and discharge  times for further clarification of the services performed during this visit.    US Ob Transvaginal   Preliminary Result   1. Single intrauterine gestational sac approximately 5 days 5 weeks by   gestational sac diameter. No visible fetal pole or yolk sac. Please   correlate with quantitative beta hCG.   2. Simple appearing 2 cm right ovarian cyst. Ovaries otherwise appear   unremarkable.   3. No other significant abnormality is identified.            D:  01/20/2017   E:  01/20/2017              Vitals:    01/20/17 1123 01/20/17 1124 01/20/17 1125 01/20/17 1327   BP: 105/60 103/68 102/66 102/66   BP Location:    Right arm   Patient Position: Lying Sitting Standing Lying   Pulse: 67 67 97 70   Resp:    16   Temp:       TempSrc:       SpO2:  99%  96%   Weight:       Height:           Medications - No data to display                       MDM    Final diagnoses:   Vaginal bleeding in pregnancy, first trimester   Abdominal pain in pregnancy, first trimester   Threatened miscarriage in early pregnancy            KARMEN Bhatia  01/20/17 8187

## 2017-01-24 ENCOUNTER — TELEPHONE (OUTPATIENT)
Dept: FAMILY MEDICINE CLINIC | Facility: CLINIC | Age: 27
End: 2017-01-24

## 2017-01-24 NOTE — TELEPHONE ENCOUNTER
Patient states that the permethrin 5%cream is not helping any with the rash.  Wanted to know that since she is pregnant what else can she try.  Pharmacy is [facility] [address]  Last ov was 01/18. Patient's only allergy is to Klonopin.

## 2017-01-24 NOTE — TELEPHONE ENCOUNTER
June, could you please call Pretty to let her know that I am not aware of any other treatment that she can use while pregnant.  Would like for her to see dermatology.  I think that epidural referral for that already in the past.  Please see if she is are seen dermatology.

## 2017-01-30 ENCOUNTER — HOSPITAL ENCOUNTER (EMERGENCY)
Facility: HOSPITAL | Age: 27
Discharge: HOME | End: 2017-01-30
Attending: EMERGENCY MEDICINE | Admitting: EMERGENCY MEDICINE

## 2017-01-30 VITALS
HEIGHT: 67 IN | WEIGHT: 123 LBS | TEMPERATURE: 98.7 F | SYSTOLIC BLOOD PRESSURE: 103 MMHG | OXYGEN SATURATION: 97 % | BODY MASS INDEX: 19.3 KG/M2 | RESPIRATION RATE: 18 BRPM | HEART RATE: 80 BPM | DIASTOLIC BLOOD PRESSURE: 59 MMHG

## 2017-01-30 DIAGNOSIS — Z34.91 FIRST TRIMESTER PREGNANCY: ICD-10-CM

## 2017-01-30 DIAGNOSIS — O21.0 HYPEREMESIS AFFECTING PREGNANCY, ANTEPARTUM: Primary | ICD-10-CM

## 2017-01-30 LAB
ABO GROUP BLD: NORMAL
ALBUMIN SERPL-MCNC: 5 G/DL (ref 3.2–4.8)
ALBUMIN/GLOB SERPL: 1.5 G/DL (ref 1.5–2.5)
ALP SERPL-CCNC: 50 U/L (ref 25–100)
ALT SERPL W P-5'-P-CCNC: 13 U/L (ref 7–40)
ANION GAP SERPL CALCULATED.3IONS-SCNC: 11 MMOL/L (ref 3–11)
AST SERPL-CCNC: 16 U/L (ref 0–33)
BASOPHILS # BLD AUTO: 0.03 10*3/MM3 (ref 0–0.2)
BASOPHILS NFR BLD AUTO: 0.3 % (ref 0–1)
BILIRUB SERPL-MCNC: 1.1 MG/DL (ref 0.3–1.2)
BILIRUB UR QL STRIP: NEGATIVE
BLD GP AB SCN SERPL QL: NEGATIVE
BUN BLD-MCNC: 11 MG/DL (ref 9–23)
BUN/CREAT SERPL: 22 (ref 7–25)
CALCIUM SPEC-SCNC: 10.2 MG/DL (ref 8.7–10.4)
CHLORIDE SERPL-SCNC: 101 MMOL/L (ref 99–109)
CLARITY UR: CLEAR
CO2 SERPL-SCNC: 23 MMOL/L (ref 20–31)
COLOR UR: YELLOW
CREAT BLD-MCNC: 0.5 MG/DL (ref 0.6–1.3)
DEPRECATED RDW RBC AUTO: 43 FL (ref 37–54)
EOSINOPHIL # BLD AUTO: 0.05 10*3/MM3 (ref 0.1–0.3)
EOSINOPHIL NFR BLD AUTO: 0.4 % (ref 0–3)
ERYTHROCYTE [DISTWIDTH] IN BLOOD BY AUTOMATED COUNT: 13.1 % (ref 11.3–14.5)
GFR SERPL CREATININE-BSD FRML MDRD: >150 ML/MIN/1.73
GLOBULIN UR ELPH-MCNC: 3.3 GM/DL
GLUCOSE BLD-MCNC: 80 MG/DL (ref 70–100)
GLUCOSE UR STRIP-MCNC: NEGATIVE MG/DL
HAV IGM SERPL QL IA: NORMAL
HBV CORE IGM SERPL QL IA: NORMAL
HBV SURFACE AG SERPL QL IA: NORMAL
HBV SURFACE AG SERPL QL IA: NORMAL
HCG INTACT+B SERPL-ACNC: NORMAL MIU/ML
HCT VFR BLD AUTO: 41.6 % (ref 34.5–44)
HCV AB SER DONR QL: NORMAL
HGB BLD-MCNC: 14.1 G/DL (ref 11.5–15.5)
HGB UR QL STRIP.AUTO: NEGATIVE
HIV1+2 AB SER QL: NORMAL
HOLD SPECIMEN: NORMAL
HOLD SPECIMEN: NORMAL
IMM GRANULOCYTES # BLD: 0.03 10*3/MM3 (ref 0–0.03)
IMM GRANULOCYTES NFR BLD: 0.3 % (ref 0–0.6)
KETONES UR QL STRIP: ABNORMAL
LEUKOCYTE ESTERASE UR QL STRIP.AUTO: NEGATIVE
LIPASE SERPL-CCNC: 30 U/L (ref 6–51)
LYMPHOCYTES # BLD AUTO: 1.89 10*3/MM3 (ref 0.6–4.8)
LYMPHOCYTES NFR BLD AUTO: 16.5 % (ref 24–44)
MCH RBC QN AUTO: 30.5 PG (ref 27–31)
MCHC RBC AUTO-ENTMCNC: 33.9 G/DL (ref 32–36)
MCV RBC AUTO: 90 FL (ref 80–99)
MONOCYTES # BLD AUTO: 0.9 10*3/MM3 (ref 0–1)
MONOCYTES NFR BLD AUTO: 7.9 % (ref 0–12)
NEUTROPHILS # BLD AUTO: 8.54 10*3/MM3 (ref 1.5–8.3)
NEUTROPHILS NFR BLD AUTO: 74.6 % (ref 41–71)
NITRITE UR QL STRIP: NEGATIVE
PH UR STRIP.AUTO: 6.5 [PH] (ref 5–8)
PLATELET # BLD AUTO: 251 10*3/MM3 (ref 150–450)
PMV BLD AUTO: 9.6 FL (ref 6–12)
POTASSIUM BLD-SCNC: 3.4 MMOL/L (ref 3.5–5.5)
PROT SERPL-MCNC: 8.3 G/DL (ref 5.7–8.2)
PROT UR QL STRIP: NEGATIVE
RBC # BLD AUTO: 4.62 10*6/MM3 (ref 3.89–5.14)
RH BLD: POSITIVE
RUBV IGG SER QL: NORMAL
RUBV IGG SER-ACNC: 15.2 IU/ML
SODIUM BLD-SCNC: 135 MMOL/L (ref 132–146)
SP GR UR STRIP: 1.02 (ref 1–1.03)
TSH SERPL DL<=0.05 MIU/L-ACNC: 0.73 MIU/ML (ref 0.35–5.35)
UROBILINOGEN UR QL STRIP: ABNORMAL
WBC NRBC COR # BLD: 11.44 10*3/MM3 (ref 3.5–10.8)
WHOLE BLOOD HOLD SPECIMEN: NORMAL
WHOLE BLOOD HOLD SPECIMEN: NORMAL

## 2017-01-30 PROCEDURE — 85025 COMPLETE CBC W/AUTO DIFF WBC: CPT | Performed by: EMERGENCY MEDICINE

## 2017-01-30 PROCEDURE — 83690 ASSAY OF LIPASE: CPT | Performed by: EMERGENCY MEDICINE

## 2017-01-30 PROCEDURE — 25010000002 PROMETHAZINE PER 50 MG: Performed by: EMERGENCY MEDICINE

## 2017-01-30 PROCEDURE — 96374 THER/PROPH/DIAG INJ IV PUSH: CPT

## 2017-01-30 PROCEDURE — 96376 TX/PRO/DX INJ SAME DRUG ADON: CPT

## 2017-01-30 PROCEDURE — G0432 EIA HIV-1/HIV-2 SCREEN: HCPCS | Performed by: EMERGENCY MEDICINE

## 2017-01-30 PROCEDURE — 86592 SYPHILIS TEST NON-TREP QUAL: CPT | Performed by: EMERGENCY MEDICINE

## 2017-01-30 PROCEDURE — 86850 RBC ANTIBODY SCREEN: CPT

## 2017-01-30 PROCEDURE — 84443 ASSAY THYROID STIM HORMONE: CPT | Performed by: EMERGENCY MEDICINE

## 2017-01-30 PROCEDURE — 96361 HYDRATE IV INFUSION ADD-ON: CPT

## 2017-01-30 PROCEDURE — 86901 BLOOD TYPING SEROLOGIC RH(D): CPT

## 2017-01-30 PROCEDURE — 36415 COLL VENOUS BLD VENIPUNCTURE: CPT

## 2017-01-30 PROCEDURE — 80074 ACUTE HEPATITIS PANEL: CPT | Performed by: EMERGENCY MEDICINE

## 2017-01-30 PROCEDURE — 99284 EMERGENCY DEPT VISIT MOD MDM: CPT

## 2017-01-30 PROCEDURE — 84702 CHORIONIC GONADOTROPIN TEST: CPT | Performed by: EMERGENCY MEDICINE

## 2017-01-30 PROCEDURE — 81003 URINALYSIS AUTO W/O SCOPE: CPT | Performed by: EMERGENCY MEDICINE

## 2017-01-30 PROCEDURE — 86900 BLOOD TYPING SEROLOGIC ABO: CPT

## 2017-01-30 PROCEDURE — 80053 COMPREHEN METABOLIC PANEL: CPT | Performed by: EMERGENCY MEDICINE

## 2017-01-30 RX ORDER — SODIUM CHLORIDE 0.9 % (FLUSH) 0.9 %
10 SYRINGE (ML) INJECTION AS NEEDED
Status: DISCONTINUED | OUTPATIENT
Start: 2017-01-30 | End: 2017-01-30 | Stop reason: HOSPADM

## 2017-01-30 RX ORDER — DIPHENHYDRAMINE HYDROCHLORIDE 25 MG/1
CAPSULE ORAL
Qty: 30 TABLET | Refills: 0 | Status: SHIPPED | OUTPATIENT
Start: 2017-01-30 | End: 2017-02-14

## 2017-01-30 RX ORDER — PROMETHAZINE HYDROCHLORIDE 25 MG/ML
12.5 INJECTION, SOLUTION INTRAMUSCULAR; INTRAVENOUS ONCE
Status: COMPLETED | OUTPATIENT
Start: 2017-01-30 | End: 2017-01-30

## 2017-01-30 RX ADMIN — PROMETHAZINE HYDROCHLORIDE 12.5 MG: 25 INJECTION INTRAMUSCULAR; INTRAVENOUS at 17:40

## 2017-01-30 RX ADMIN — Medication 10 ML: at 17:41

## 2017-01-30 RX ADMIN — SODIUM CHLORIDE 1000 ML: 9 INJECTION, SOLUTION INTRAVENOUS at 19:59

## 2017-01-30 RX ADMIN — SODIUM CHLORIDE 1000 ML: 9 INJECTION, SOLUTION INTRAVENOUS at 17:38

## 2017-01-30 RX ADMIN — SODIUM CHLORIDE 1000 ML: 9 INJECTION, SOLUTION INTRAVENOUS at 17:09

## 2017-01-30 RX ADMIN — PROMETHAZINE HYDROCHLORIDE 12.5 MG: 25 INJECTION INTRAMUSCULAR; INTRAVENOUS at 19:30

## 2017-02-01 LAB — RPR SER QL: NORMAL

## 2017-02-14 ENCOUNTER — OFFICE VISIT (OUTPATIENT)
Dept: FAMILY MEDICINE CLINIC | Facility: CLINIC | Age: 27
End: 2017-02-14

## 2017-02-14 VITALS
WEIGHT: 130 LBS | BODY MASS INDEX: 20.4 KG/M2 | OXYGEN SATURATION: 97 % | HEART RATE: 69 BPM | SYSTOLIC BLOOD PRESSURE: 100 MMHG | DIASTOLIC BLOOD PRESSURE: 78 MMHG | HEIGHT: 67 IN

## 2017-02-14 DIAGNOSIS — Z30.013 ENCOUNTER FOR INITIAL PRESCRIPTION OF INJECTABLE CONTRACEPTIVE: ICD-10-CM

## 2017-02-14 DIAGNOSIS — F41.1 GENERALIZED ANXIETY DISORDER: ICD-10-CM

## 2017-02-14 DIAGNOSIS — O03.9 SPONTANEOUS ABORTION: ICD-10-CM

## 2017-02-14 DIAGNOSIS — K59.1 FUNCTIONAL DIARRHEA: ICD-10-CM

## 2017-02-14 DIAGNOSIS — Z71.6 TOBACCO ABUSE COUNSELING: Primary | ICD-10-CM

## 2017-02-14 PROCEDURE — 99406 BEHAV CHNG SMOKING 3-10 MIN: CPT | Performed by: PHYSICIAN ASSISTANT

## 2017-02-14 PROCEDURE — 99214 OFFICE O/P EST MOD 30 MIN: CPT | Performed by: PHYSICIAN ASSISTANT

## 2017-02-14 RX ORDER — NICOTINE 21 MG/24HR
1 PATCH, TRANSDERMAL 24 HOURS TRANSDERMAL EVERY 24 HOURS
Qty: 7 PATCH | Refills: 1 | Status: SHIPPED | OUTPATIENT
Start: 2017-02-14 | End: 2017-11-10

## 2017-02-14 RX ORDER — FLUVOXAMINE MALEATE 100 MG
50 TABLET ORAL NIGHTLY
COMMUNITY
End: 2017-03-31 | Stop reason: SDUPTHER

## 2017-02-14 RX ORDER — MEDROXYPROGESTERONE ACETATE 150 MG/ML
150 INJECTION, SUSPENSION INTRAMUSCULAR
Qty: 1 ML | Refills: 4 | Status: SHIPPED | OUTPATIENT
Start: 2017-02-14 | End: 2017-05-26 | Stop reason: SDUPTHER

## 2017-02-14 NOTE — PROGRESS NOTES
"Subjective   Pretty Merritt is a 26 y.o. female    History of Present Illness  Patient presents today for follow-up on generalized anxiety disorder/obsessive-compulsive disorder, history of IBS.  Patient had her meds discontinued recently due to a positive pregnancy test that she has since miscarried would like to get back on her meds.  Patient states that she saw her gynecologist Dr. Pierre after positive pressure test, had a Pap smear done about 2-3 weeks ago that unfortunately showed positive for chlamydia.  It turns out that her boyfriend had been out with someone else and contracted chlamydia.  She was treated for this recently but then she believes she had a miscarriage on Friday.  She did not contact her gynecologist yet.  She states she start cramping and having excessive vaginal bleeding on Friday and feels that she had a miscarriage P she did not go to ER or contact her gynecologist.  She states she just \"dealt with it at home\".  She is wondering if she needs to see her gynecologist for further treatment.  She states the bleeding has essentially stopped and she is not cramping any today.  Her anxiety has been much worse since Friday.  She would like to get back on her Xanax.  She did restart her Luvox.  She was able to quit smoking when she found that she's pregnant started back this weekend and a full pack per day.  She would also like to start on Depo-Provera as this worked well for her in the past and she thinks that it would be best if she did not get pregnant again at this time.  the following portions of the patient's history were reviewed and updated as appropriate: allergies, current medications, past social history and problem list    Review of Systems   Constitutional: Negative for appetite change, fatigue and fever.   Respiratory: Negative for chest tightness and shortness of breath.    Gastrointestinal: Negative for abdominal pain, diarrhea and nausea.   Genitourinary: Positive for vaginal " bleeding and vaginal discharge. Negative for genital sores, pelvic pain and vaginal pain.   Neurological: Negative for dizziness, tremors, weakness, light-headedness and headaches.   Psychiatric/Behavioral: Negative for agitation, behavioral problems, confusion, decreased concentration, dysphoric mood, sleep disturbance and suicidal ideas. The patient is nervous/anxious.        Objective     Vitals:    02/14/17 0912   BP: 100/78   Pulse: 69   SpO2: 97%       Physical Exam   Constitutional: She is oriented to person, place, and time. She appears well-developed and well-nourished.   Eyes: Conjunctivae are normal.   Neck: No thyroid mass and no thyromegaly present.   Cardiovascular: Normal rate, regular rhythm and normal heart sounds.    Pulmonary/Chest: Effort normal and breath sounds normal.   Abdominal: Soft. Bowel sounds are normal. She exhibits no distension and no mass. There is no tenderness. There is no rebound and no guarding. No hernia.   Neurological: She is alert and oriented to person, place, and time.   Skin: Skin is warm and dry. No rash noted. No erythema.   Psychiatric: Her speech is normal and behavior is normal. Judgment and thought content normal. Her mood appears anxious. Her affect is not angry and not inappropriate. Cognition and memory are normal. She does not exhibit a depressed mood. She is attentive.   Nursing note and vitals reviewed.    I counseled patient regarding the health benefits in smoking cessation for 3 minutes.  I also discussed with patient the increased health risks of continued smoking, including increased risk for cancers, increased risk for coronary artery disease, increased risk for stroke, heart attack, COPD and overall worsened lung function and increased lung infections.  We discussed different strategies for smoking cessation and prescriptions were offered to assist patient in smoking cessation.  Prescription given for nicotine patches.    Assessment/Plan     Diagnoses  and all orders for this visit:    Tobacco abuse counseling  -     nicotine (NICOTINE STEP 1) 21 MG/24HR patch; Place 1 patch on the skin Daily.  -     nicotine (NICOTINE STEP 2) 14 MG/24HR patch; Place 1 patch on the skin Daily.  -     nicotine (NICOTINE STEP 3) 7 MG/24HR patch; Place 1 patch on the skin Daily.    Spontaneous   -     HCG, B-subunit, Quantitative    Encounter for initial prescription of injectable contraceptive  -     medroxyPROGESTERone (DEPO-PROVERA) 150 MG/ML injection; Inject 1 mL into the shoulder, thigh, or buttocks Every 3 (Three) Months.    Generalized anxiety disorder    Functional diarrhea    Other orders  -     fluvoxaMINE (LUVOX) 100 MG tablet; Take 50 mg by mouth Every Night.     refilled Xanax 0.5 mg 3 times a day as needed for anxiety #90 with 2 refills.  Wil appropriate, controlled substance agreement up-to-date.

## 2017-03-09 ENCOUNTER — OFFICE VISIT (OUTPATIENT)
Dept: FAMILY MEDICINE CLINIC | Facility: CLINIC | Age: 27
End: 2017-03-09

## 2017-03-09 VITALS
HEIGHT: 67 IN | DIASTOLIC BLOOD PRESSURE: 60 MMHG | WEIGHT: 132 LBS | OXYGEN SATURATION: 99 % | BODY MASS INDEX: 20.72 KG/M2 | TEMPERATURE: 98.4 F | SYSTOLIC BLOOD PRESSURE: 102 MMHG | HEART RATE: 67 BPM

## 2017-03-09 DIAGNOSIS — L30.9 DERMATITIS: Primary | ICD-10-CM

## 2017-03-09 DIAGNOSIS — G44.219 EPISODIC TENSION-TYPE HEADACHE, NOT INTRACTABLE: ICD-10-CM

## 2017-03-09 PROCEDURE — 99214 OFFICE O/P EST MOD 30 MIN: CPT | Performed by: PHYSICIAN ASSISTANT

## 2017-03-09 RX ORDER — PERMETHRIN 50 MG/G
1 CREAM TOPICAL DAILY
Refills: 0 | COMMUNITY
Start: 2017-03-06 | End: 2017-03-09 | Stop reason: SDUPTHER

## 2017-03-09 RX ORDER — METRONIDAZOLE 500 MG/1
1 TABLET ORAL 2 TIMES DAILY
Refills: 0 | COMMUNITY
Start: 2017-03-06 | End: 2017-03-31

## 2017-03-09 RX ORDER — METHOCARBAMOL 750 MG/1
750 TABLET, FILM COATED ORAL 3 TIMES DAILY
Qty: 15 TABLET | Refills: 0 | Status: SHIPPED | OUTPATIENT
Start: 2017-03-09 | End: 2017-08-24

## 2017-03-09 RX ORDER — MEDROXYPROGESTERONE ACETATE 150 MG/ML
1 INJECTION, SUSPENSION INTRAMUSCULAR
Refills: 0 | COMMUNITY
Start: 2017-02-14 | End: 2019-06-19

## 2017-03-09 RX ORDER — PERMETHRIN 50 MG/G
1 CREAM TOPICAL DAILY
Qty: 60 G | Refills: 1 | Status: SHIPPED | OUTPATIENT
Start: 2017-03-09 | End: 2017-04-27 | Stop reason: SDUPTHER

## 2017-03-09 RX ORDER — INDOMETHACIN 50 MG/1
50 CAPSULE ORAL 3 TIMES DAILY PRN
Qty: 15 CAPSULE | Refills: 0 | Status: SHIPPED | OUTPATIENT
Start: 2017-03-09 | End: 2017-03-31

## 2017-03-09 NOTE — PROGRESS NOTES
Subjective   Pretty Merritt is a 26 y.o. female    History of Present Illness      Patient presents today for evaluation of 2 uncontrolled problems.  She states that she has a rash returning that feels similar to her previous diagnosis of scabies.  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  She thinks her boyfriend has scabies also.  They're both having symptoms of a rash that itches at night time.  Her second concern is of a headache she's noticed in the back of her neck up to the back of her head for the past 3-4 days.  She feels some tension in her neck.  No head trauma.  No visual changes.  No fever.  No nausea or vomiting.  The following portions of the patient's history were reviewed and updated as appropriate: allergies, current medications, past social history and problem list    Review of Systems   Constitutional: Negative for fatigue, fever and unexpected weight change.   HENT: Negative for congestion, dental problem, postnasal drip, sinus pressure and sore throat.    Eyes: Negative for photophobia, pain and visual disturbance.   Gastrointestinal: Negative for nausea and vomiting.   Musculoskeletal: Positive for neck pain. Negative for arthralgias.   Skin: Positive for color change and rash ( Rash noted on legs and trunk.). Negative for pallor and wound.   Neurological: Positive for headaches. Negative for dizziness, syncope, facial asymmetry, speech difficulty, weakness, light-headedness and numbness.   Psychiatric/Behavioral: Negative for agitation, confusion, dysphoric mood and sleep disturbance. The patient is not nervous/anxious.        Objective     Vitals:    03/09/17 0908   BP: 102/60   Pulse: 67   Temp: 98.4 °F (36.9 °C)   SpO2: 99%       Physical Exam   Constitutional: She is oriented to person, place, and time. She appears  well-developed and well-nourished. No distress.   HENT:   Head: Normocephalic and atraumatic.   Eyes: Conjunctivae and EOM are normal. Pupils are equal, round, and reactive to light.   Neck: Normal range of motion. No JVD present. Muscular tenderness ( Left posterior cervical region muscle spasm noted) present. Rigidity present. No tracheal deviation present. No thyromegaly present.   Cardiovascular: Normal rate and regular rhythm.    Pulmonary/Chest: Effort normal and breath sounds normal. No stridor.   Lymphadenopathy:     She has no cervical adenopathy.   Neurological: She is alert and oriented to person, place, and time. No cranial nerve deficit or sensory deficit.   Skin: Skin is warm and dry. Rash noted. She is not diaphoretic. There is erythema. No pallor.   Scattered erythematous maculopapular excoriated rash   Psychiatric: She has a normal mood and affect. Her speech is normal and behavior is normal. Judgment and thought content normal. Cognition and memory are normal.   Nursing note and vitals reviewed.      Assessment/Plan     Diagnoses and all orders for this visit:    Dermatitis  -     permethrin (ELIMITE) 5 % cream; Apply 1 application topically Daily.    Episodic tension-type headache, not intractable  -     methocarbamol (ROBAXIN) 750 MG tablet; Take 1 tablet by mouth 3 (Three) Times a Day. As needed for muscle tension in neck  -     indomethacin (INDOCIN) 50 MG capsule; Take 1 capsule by mouth 3 (Three) Times a Day As Needed for Mild Pain (1-3). For headache and neck pain    Other orders  -     Discontinue: permethrin (ELIMITE) 5 % cream; Apply 1 application topically Daily.  -     metroNIDAZOLE (FLAGYL) 500 MG tablet; Take 1 tablet by mouth 2 (Two) Times a Day.  -     MedroxyPROGESTERone Acetate 150 MG/ML suspension prefilled syringe; Inject 1 mL as directed Every 3 (Three) Months.

## 2017-03-31 ENCOUNTER — OFFICE VISIT (OUTPATIENT)
Dept: FAMILY MEDICINE CLINIC | Facility: CLINIC | Age: 27
End: 2017-03-31

## 2017-03-31 VITALS
SYSTOLIC BLOOD PRESSURE: 100 MMHG | OXYGEN SATURATION: 97 % | HEART RATE: 60 BPM | TEMPERATURE: 98.3 F | HEIGHT: 67 IN | BODY MASS INDEX: 20.25 KG/M2 | WEIGHT: 129 LBS | DIASTOLIC BLOOD PRESSURE: 62 MMHG

## 2017-03-31 DIAGNOSIS — Z30.42 ENCOUNTER FOR SURVEILLANCE OF INJECTABLE CONTRACEPTIVE: ICD-10-CM

## 2017-03-31 DIAGNOSIS — F41.0 PANIC ATTACKS: ICD-10-CM

## 2017-03-31 DIAGNOSIS — N83.209 CYST OF OVARY, UNSPECIFIED LATERALITY: ICD-10-CM

## 2017-03-31 DIAGNOSIS — F41.1 GENERALIZED ANXIETY DISORDER: Primary | ICD-10-CM

## 2017-03-31 PROCEDURE — 99214 OFFICE O/P EST MOD 30 MIN: CPT | Performed by: PHYSICIAN ASSISTANT

## 2017-03-31 PROCEDURE — 96372 THER/PROPH/DIAG INJ SC/IM: CPT | Performed by: PHYSICIAN ASSISTANT

## 2017-03-31 RX ORDER — FLUVOXAMINE MALEATE 100 MG
TABLET ORAL
Qty: 45 TABLET | Refills: 2 | Status: SHIPPED | OUTPATIENT
Start: 2017-03-31 | End: 2017-07-30 | Stop reason: SDUPTHER

## 2017-03-31 RX ORDER — MEDROXYPROGESTERONE ACETATE 150 MG/ML
150 INJECTION, SUSPENSION INTRAMUSCULAR ONCE
Status: COMPLETED | OUTPATIENT
Start: 2017-03-31 | End: 2017-03-31

## 2017-03-31 RX ADMIN — MEDROXYPROGESTERONE ACETATE 150 MG: 150 INJECTION, SUSPENSION INTRAMUSCULAR at 08:11

## 2017-03-31 NOTE — PROGRESS NOTES
Subjective   Pretty Merritt is a 26 y.o. female    History of Present Illness    Patient comes in today to discuss 3 separate issues.  She would like to start on Depo-Provera for birth control.  She recently had a miscarriage and has been examined and checked by Dr. Chaudhry, GYN and cleared that her exam was normal following her miscarriage with exception of an ovarian cyst.  She wants to discuss this with me today.  She also wants to start on dip oh.  She has taken Depakote in the past and did well on it.  She has 3 children currently and is not interested in having any more children at this time.  She states she has been feeling some pelvic twinges that she thinks associated with the ovarian cyst and she would like my advice on what to do for treatment.  She also wants to discuss uncontrolled anxiety and panic attacks.  She states that she is under a lot of stress right now she just moved into a new house.  She states that the Xanax she is taking does help her with anxiety and reduce her panic attacks but doesn't seem that it is controlling them as well as it used to and she is still having breakthrough panic attacks.  She is not suicidal or homicidal.  Patient states she is still smoking but is smoking less than she used to.  She is aware that continuing to smoke will aggravate and worsen her anxiety.  The following portions of the patient's history were reviewed and updated as appropriate: allergies, current medications, past social history and problem list    Review of Systems   Constitutional: Negative for appetite change and fatigue.   Respiratory: Negative for chest tightness and shortness of breath.    Gastrointestinal: Negative for abdominal pain, diarrhea and nausea.   Genitourinary: Positive for pelvic pain.   Neurological: Negative for dizziness, tremors, weakness, light-headedness and headaches.   Psychiatric/Behavioral: Negative for agitation, behavioral problems, confusion, decreased concentration,  dysphoric mood, hallucinations, self-injury, sleep disturbance and suicidal ideas. The patient is nervous/anxious. The patient is not hyperactive.        Objective     Vitals:    03/31/17 0802   BP: 100/62   Pulse: 60   Temp: 98.3 °F (36.8 °C)   SpO2: 97%       Physical Exam   Constitutional: She is oriented to person, place, and time. She appears well-developed and well-nourished.   Neck: No thyroid mass and no thyromegaly present.   Cardiovascular: Normal rate, regular rhythm and normal heart sounds.    Pulmonary/Chest: Effort normal.   Neurological: She is alert and oriented to person, place, and time.   Psychiatric: Her speech is normal and behavior is normal. Judgment and thought content normal. Her mood appears anxious. Her affect is not angry and not inappropriate. Cognition and memory are normal. She does not exhibit a depressed mood. She is attentive.   Nursing note and vitals reviewed.      Assessment/Plan     Diagnoses and all orders for this visit:    Generalized anxiety disorder    Encounter for surveillance of injectable contraceptive  -     medroxyPROGESTERone (DEPO-PROVERA) injection 150 mg; Inject 1 mL into the shoulder, thigh, or buttocks 1 (One) Time.    Panic attacks  -     Ambulatory Referral to Psychiatry    Cyst of ovary, unspecified laterality    Other orders  -     fluvoxaMINE (LUVOX) 100 MG tablet; 1.5 nightly, new dose     Depo-Provera given today, follow-up in 12 weeks for  next shot. recommended over-the-counter ibuprofen as needed for discomfort associated with ovarian cyst diagnosed by GYN.  Refer to psychiatry while continuing on current dosage of Xanax but increasing dosage of Luvox from 100-150 mg.

## 2017-04-27 ENCOUNTER — OFFICE VISIT (OUTPATIENT)
Dept: FAMILY MEDICINE CLINIC | Facility: CLINIC | Age: 27
End: 2017-04-27

## 2017-04-27 VITALS
DIASTOLIC BLOOD PRESSURE: 60 MMHG | HEART RATE: 54 BPM | HEIGHT: 67 IN | WEIGHT: 127 LBS | SYSTOLIC BLOOD PRESSURE: 98 MMHG | BODY MASS INDEX: 19.93 KG/M2 | TEMPERATURE: 98.1 F | OXYGEN SATURATION: 99 %

## 2017-04-27 DIAGNOSIS — N83.201 CYST OF RIGHT OVARY: ICD-10-CM

## 2017-04-27 DIAGNOSIS — N39.0 URINARY TRACT INFECTION, SITE UNSPECIFIED: ICD-10-CM

## 2017-04-27 DIAGNOSIS — F41.9 ANXIETY: ICD-10-CM

## 2017-04-27 DIAGNOSIS — L30.9 DERMATITIS: ICD-10-CM

## 2017-04-27 DIAGNOSIS — R30.0 BURNING WITH URINATION: Primary | ICD-10-CM

## 2017-04-27 LAB
BILIRUB BLD-MCNC: NEGATIVE MG/DL
CLARITY, POC: ABNORMAL
COLOR UR: YELLOW
GLUCOSE UR STRIP-MCNC: NEGATIVE MG/DL
KETONES UR QL: NEGATIVE
LEUKOCYTE EST, POC: ABNORMAL
NITRITE UR-MCNC: NEGATIVE MG/ML
PH UR: 5 [PH] (ref 5–8)
PROT UR STRIP-MCNC: NEGATIVE MG/DL
RBC # UR STRIP: ABNORMAL /UL
SP GR UR: 1.02 (ref 1–1.03)
UROBILINOGEN UR QL: NORMAL

## 2017-04-27 PROCEDURE — 81003 URINALYSIS AUTO W/O SCOPE: CPT | Performed by: PHYSICIAN ASSISTANT

## 2017-04-27 PROCEDURE — 99214 OFFICE O/P EST MOD 30 MIN: CPT | Performed by: PHYSICIAN ASSISTANT

## 2017-04-27 RX ORDER — PERMETHRIN 50 MG/G
1 CREAM TOPICAL DAILY
Qty: 2 EACH | Refills: 1 | Status: SHIPPED | OUTPATIENT
Start: 2017-04-27 | End: 2017-05-04 | Stop reason: SDUPTHER

## 2017-04-27 RX ORDER — NITROFURANTOIN 25; 75 MG/1; MG/1
100 CAPSULE ORAL 2 TIMES DAILY
Qty: 14 CAPSULE | Refills: 0 | Status: SHIPPED | OUTPATIENT
Start: 2017-04-27 | End: 2017-08-24

## 2017-04-27 RX ORDER — FLUCONAZOLE 150 MG/1
150 TABLET ORAL ONCE
Qty: 1 TABLET | Refills: 0 | Status: SHIPPED | OUTPATIENT
Start: 2017-04-27 | End: 2017-04-27

## 2017-04-27 NOTE — PROGRESS NOTES
Subjective   Pretty Merritt is a 26 y.o. female    History of Present Illness    Patient presents today for evaluation of dysuria over the past several days with urinary frequency and urgency.  She is also continued to have some right lower quadrant pelvic pain for the past month.  She was diagnosed with an ovarian cyst on her right ovary over a month ago.  She states she is still waiting to get in to see gynecologist.  She is on Depo-Provera and received her first shot at the end of March.  She states she has had some mild vaginal spotting.  She's here today for follow-up on her anxiety as well.  She states that her anxiety is much improved after adjusting her dosage of Luvox and continuing on Xanax.  She has not been in to see psychiatry yet but is requesting a referral because she still hasn't been able to get an appointment with them yet.  She states she does have ongoing anxiety on a daily basis but it does seem well-controlled at this time on her current medications.  Additionally patient feels that she is still having difficulty with scabies and requests a refill of her cream for this.  She states that she feels she her boyfriend both need to be treated again, they were living at her boyfriend's mother's house and she feels that the home was never cleaned well enough, now they're in their own home where she can keep it clean and she would like another prescription for Elimite.  She still having some bumps that itch on her skin.  The following portions of the patient's history were reviewed and updated as appropriate: allergies, current medications, past social history and problem list    Review of Systems   Constitutional: Negative for appetite change, chills, fatigue, fever and unexpected weight change.   Respiratory: Negative for cough, chest tightness and shortness of breath.    Cardiovascular: Negative for chest pain.   Gastrointestinal: Negative for abdominal distention, abdominal pain, blood in stool,  constipation, diarrhea, nausea and vomiting.   Genitourinary: Positive for frequency, hematuria, pelvic pain and urgency. Negative for difficulty urinating and dysuria.   Musculoskeletal: Negative for arthralgias and back pain.   Skin: Positive for rash. Negative for color change, pallor and wound.   Allergic/Immunologic: Negative for food allergies.   Neurological: Negative for dizziness, tremors, weakness, light-headedness and headaches.   Psychiatric/Behavioral: Negative for agitation, behavioral problems, confusion, decreased concentration, dysphoric mood, sleep disturbance and suicidal ideas. The patient is nervous/anxious ( Anxiety stable at this time and controlled on meds).        Objective     Vitals:    04/27/17 0807   BP: 98/60   Pulse: 54   Temp: 98.1 °F (36.7 °C)   SpO2: 99%       Physical Exam   Constitutional: She is oriented to person, place, and time. She appears well-developed and well-nourished. No distress.   Eyes: Conjunctivae are normal.   Neck: No thyroid mass and no thyromegaly present.   Cardiovascular: Normal rate, regular rhythm and normal heart sounds.    Pulmonary/Chest: Effort normal and breath sounds normal.   Abdominal: Soft. Bowel sounds are normal. She exhibits no distension and no mass. There is tenderness ( Mild tenderness to palpation over suprapubic region and mild topalpation of her right lower quadrant, no rebound rigidity or guarding noted and no masses noted.). There is no rebound and no guarding. No hernia.   Neurological: She is alert and oriented to person, place, and time.   Skin: Skin is warm and dry. Rash noted. She is not diaphoretic. No erythema. No pallor.   Evidence of previous rash with fading maculopapular areas on bilateral upper and lower extremities, no new lesions seen.   Psychiatric: Her speech is normal and behavior is normal. Judgment and thought content normal. Her mood appears anxious. Her affect is not angry and not inappropriate. Cognition and memory  are normal. She does not exhibit a depressed mood. She is attentive.   Nursing note and vitals reviewed.      Assessment/Plan     Diagnoses and all orders for this visit:    Burning with urination  -     POCT urinalysis dipstick, automated  -     nitrofurantoin, macrocrystal-monohydrate, (MACROBID) 100 MG capsule; Take 1 capsule by mouth 2 (Two) Times a Day.  -     fluconazole (DIFLUCAN) 150 MG tablet; Take 1 tablet by mouth 1 (One) Time for 1 dose.    Dermatitis  -     permethrin (ELIMITE) 5 % cream; Apply 1 application topically Daily.    Anxiety  -     Ambulatory Referral to Psychiatry    Cyst of right ovary  -     Ambulatory Referral to Gynecology    Urinary tract infection, site unspecified    Refilled Xanax at current dosage of 0.5 mg 3 times a day #90 with 2 refills.  Controlled substance agreement signed and discussed, abuse potential of this medication discussed with patient, referral placed for psychiatry for further evaluation and Wil appropriate.  Follow-up in 3 months for recheck.

## 2017-05-04 DIAGNOSIS — L30.9 DERMATITIS: ICD-10-CM

## 2017-05-04 RX ORDER — PERMETHRIN 50 MG/G
1 CREAM TOPICAL DAILY
Qty: 2 EACH | Refills: 1 | Status: SHIPPED | OUTPATIENT
Start: 2017-05-04 | End: 2017-05-31 | Stop reason: SDUPTHER

## 2017-05-26 DIAGNOSIS — Z30.013 ENCOUNTER FOR INITIAL PRESCRIPTION OF INJECTABLE CONTRACEPTIVE: ICD-10-CM

## 2017-05-26 RX ORDER — MEDROXYPROGESTERONE ACETATE 150 MG/ML
150 INJECTION, SUSPENSION INTRAMUSCULAR
Qty: 1 ML | Refills: 4 | Status: SHIPPED | OUTPATIENT
Start: 2017-05-26 | End: 2017-05-31 | Stop reason: SDUPTHER

## 2017-05-31 DIAGNOSIS — Z30.013 ENCOUNTER FOR INITIAL PRESCRIPTION OF INJECTABLE CONTRACEPTIVE: ICD-10-CM

## 2017-05-31 DIAGNOSIS — L30.9 DERMATITIS: ICD-10-CM

## 2017-05-31 RX ORDER — PERMETHRIN 50 MG/G
1 CREAM TOPICAL DAILY
Qty: 2 EACH | Refills: 1 | Status: SHIPPED | OUTPATIENT
Start: 2017-05-31 | End: 2017-11-10

## 2017-05-31 RX ORDER — MEDROXYPROGESTERONE ACETATE 150 MG/ML
150 INJECTION, SUSPENSION INTRAMUSCULAR
Qty: 1 ML | Refills: 4 | Status: SHIPPED | OUTPATIENT
Start: 2017-05-31 | End: 2017-12-04 | Stop reason: SDUPTHER

## 2017-06-01 DIAGNOSIS — J40 BRONCHITIS: ICD-10-CM

## 2017-06-01 RX ORDER — ALBUTEROL SULFATE 90 UG/1
2 AEROSOL, METERED RESPIRATORY (INHALATION) EVERY 4 HOURS PRN
Qty: 1 INHALER | Refills: 1 | Status: SHIPPED | OUTPATIENT
Start: 2017-06-01 | End: 2017-07-30 | Stop reason: SDUPTHER

## 2017-06-05 ENCOUNTER — TELEPHONE (OUTPATIENT)
Dept: FAMILY MEDICINE CLINIC | Facility: CLINIC | Age: 27
End: 2017-06-05

## 2017-06-05 RX ORDER — DEXTROMETHORPHAN HYDROBROMIDE AND PROMETHAZINE HYDROCHLORIDE 15; 6.25 MG/5ML; MG/5ML
5 SYRUP ORAL 4 TIMES DAILY PRN
Qty: 180 ML | Refills: 0 | Status: SHIPPED | OUTPATIENT
Start: 2017-06-05 | End: 2017-07-03 | Stop reason: SDUPTHER

## 2017-06-05 NOTE — TELEPHONE ENCOUNTER
----- Message from Ann Caceres sent at 6/5/2017 12:13 PM EDT -----  Patient would like a refill on her Promethazine cough syrup. She is coughing and she went to [facility] on [address] Saturday and the coughing is keeping her up and her chest hurts. Patient uses [facility] on [address]..  ThanksAnn..

## 2017-06-09 ENCOUNTER — TELEPHONE (OUTPATIENT)
Dept: FAMILY MEDICINE CLINIC | Facility: CLINIC | Age: 27
End: 2017-06-09

## 2017-06-09 NOTE — TELEPHONE ENCOUNTER
June, there is no problem with her taking Xanax and fluvoxamine/Luvox.  She has been filling this for quite some time.  I prescribed the fluvoxamine, Luvox in the past, please contact the pharmacy and see if you can clarify this message.

## 2017-06-09 NOTE — TELEPHONE ENCOUNTER
----- Message from Shu Mahajan sent at 6/9/2017  8:51 AM EDT -----  Contact: NIMISHA FOURNIER-[phone]  #INTERACTION WITH XANAX AND FLU VOSOMINE WITH THESE MEDS.  DR. KRISHNAMURTHY PRESCRIBED THE XANAX; ANOTHER PROVIDER PRESCRIBED THE 2ND MED.  PLEASE CONTACT RX BACK @ ABOVE #.

## 2017-07-03 DIAGNOSIS — Z71.6 TOBACCO ABUSE COUNSELING: ICD-10-CM

## 2017-07-03 RX ORDER — DEXTROMETHORPHAN HYDROBROMIDE AND PROMETHAZINE HYDROCHLORIDE 15; 6.25 MG/5ML; MG/5ML
SYRUP ORAL
Qty: 120 ML | Refills: 0 | Status: SHIPPED | OUTPATIENT
Start: 2017-07-03 | End: 2017-07-17 | Stop reason: SDUPTHER

## 2017-07-17 DIAGNOSIS — Z71.6 TOBACCO ABUSE COUNSELING: ICD-10-CM

## 2017-07-18 RX ORDER — DEXTROMETHORPHAN HYDROBROMIDE AND PROMETHAZINE HYDROCHLORIDE 15; 6.25 MG/5ML; MG/5ML
SYRUP ORAL
Qty: 120 ML | Refills: 0 | Status: SHIPPED | OUTPATIENT
Start: 2017-07-18 | End: 2017-08-24

## 2017-07-30 DIAGNOSIS — Z71.6 TOBACCO ABUSE COUNSELING: ICD-10-CM

## 2017-07-30 DIAGNOSIS — J40 BRONCHITIS: ICD-10-CM

## 2017-08-01 RX ORDER — FLUVOXAMINE MALEATE 100 MG
TABLET ORAL
Qty: 45 TABLET | Refills: 3 | OUTPATIENT
Start: 2017-08-01 | End: 2019-06-19

## 2017-08-01 RX ORDER — ALBUTEROL SULFATE 90 UG/1
AEROSOL, METERED RESPIRATORY (INHALATION)
Qty: 1 INHALER | Refills: 3 | OUTPATIENT
Start: 2017-08-01 | End: 2019-06-19

## 2017-08-09 ENCOUNTER — OFFICE VISIT (OUTPATIENT)
Dept: FAMILY MEDICINE CLINIC | Facility: CLINIC | Age: 27
End: 2017-08-09

## 2017-08-09 VITALS
SYSTOLIC BLOOD PRESSURE: 110 MMHG | OXYGEN SATURATION: 100 % | WEIGHT: 127 LBS | RESPIRATION RATE: 14 BRPM | DIASTOLIC BLOOD PRESSURE: 68 MMHG | HEART RATE: 66 BPM | BODY MASS INDEX: 19.89 KG/M2

## 2017-08-09 DIAGNOSIS — F41.0 PANIC ATTACKS: ICD-10-CM

## 2017-08-09 DIAGNOSIS — F41.9 ANXIETY: Primary | ICD-10-CM

## 2017-08-09 PROCEDURE — 99213 OFFICE O/P EST LOW 20 MIN: CPT | Performed by: PHYSICIAN ASSISTANT

## 2017-08-09 NOTE — PROGRESS NOTES
Subjective   Pretty Merritt is a 27 y.o. female    History of Present Illness  Patient presents today for follow-up on generalized anxiety disorder and for evaluation of medications for this condition.  She has been on Xanax 0.5 mg 3 times daily as needed along with Luvox.  She is currently working in housekeeping at [employer] and states she is coping well with daily stressors.  She feels her medications helping significantly, she is taking her Xanax 3 times daily one each morning, mid afternoon and at bedtime.  She is resting well at night.  Unfortunate she is still smoking, she states she is still smoking about the same amount, 1 pack per day.  The following portions of the patient's history were reviewed and updated as appropriate: allergies, current medications, past social history and problem list    Review of Systems   Constitutional: Negative for appetite change and fatigue.   Respiratory: Negative for chest tightness and shortness of breath.    Gastrointestinal: Negative for abdominal pain, diarrhea and nausea.   Neurological: Negative for dizziness, tremors, weakness, light-headedness and headaches.   Psychiatric/Behavioral: Negative for agitation, behavioral problems, confusion, decreased concentration, dysphoric mood, sleep disturbance and suicidal ideas. The patient is not nervous/anxious ( Well-controlled on meds).        Objective     Vitals:    08/09/17 0824   BP: 110/68   Pulse: 66   Resp: 14   SpO2: 100%       Physical Exam   Constitutional: She is oriented to person, place, and time. She appears well-developed and well-nourished.   Neck: No thyroid mass and no thyromegaly present.   Cardiovascular: Normal rate, regular rhythm and normal heart sounds.    Pulmonary/Chest: Effort normal.   Neurological: She is alert and oriented to person, place, and time.   Psychiatric: Her speech is normal and behavior is normal. Judgment and thought content normal. Her mood appears anxious.  Her affect is not angry and not inappropriate. Cognition and memory are normal. She does not exhibit a depressed mood. She is attentive.   Nursing note and vitals reviewed.      Assessment/Plan     Diagnoses and all orders for this visit:    Anxiety    Panic attacks  Generalized anxiety disorder is stable on medications, continue on Luvox and refill Xanax 0.5 mg 1 3 times a day #90 with 2 refills.  Eduardo appropriate, discussed abuse potential of medication, follow-up in office in 3 months for recheck.

## 2017-08-22 PROCEDURE — 99283 EMERGENCY DEPT VISIT LOW MDM: CPT

## 2017-08-23 ENCOUNTER — HOSPITAL ENCOUNTER (EMERGENCY)
Facility: HOSPITAL | Age: 27
Discharge: HOME | End: 2017-08-23
Attending: EMERGENCY MEDICINE | Admitting: EMERGENCY MEDICINE

## 2017-08-23 VITALS
DIASTOLIC BLOOD PRESSURE: 59 MMHG | HEART RATE: 106 BPM | HEIGHT: 67 IN | OXYGEN SATURATION: 100 % | SYSTOLIC BLOOD PRESSURE: 121 MMHG | RESPIRATION RATE: 16 BRPM | WEIGHT: 120 LBS | BODY MASS INDEX: 18.83 KG/M2 | TEMPERATURE: 97.8 F

## 2017-08-23 DIAGNOSIS — S91.119D: Primary | ICD-10-CM

## 2017-08-23 PROCEDURE — 96372 THER/PROPH/DIAG INJ SC/IM: CPT

## 2017-08-23 RX ORDER — CLINDAMYCIN PHOSPHATE 150 MG/ML
600 INJECTION, SOLUTION INTRAVENOUS ONCE
Status: COMPLETED | OUTPATIENT
Start: 2017-08-23 | End: 2017-08-23

## 2017-08-23 RX ORDER — HYDROCODONE BITARTRATE AND ACETAMINOPHEN 7.5; 325 MG/1; MG/1
1 TABLET ORAL ONCE
Status: COMPLETED | OUTPATIENT
Start: 2017-08-23 | End: 2017-08-23

## 2017-08-23 RX ORDER — SULFAMETHOXAZOLE AND TRIMETHOPRIM 800; 160 MG/1; MG/1
1 TABLET ORAL 2 TIMES DAILY
Qty: 20 TABLET | Refills: 0 | Status: SHIPPED | OUTPATIENT
Start: 2017-08-23 | End: 2017-09-06

## 2017-08-23 RX ORDER — ACETAMINOPHEN AND CODEINE PHOSPHATE 300; 60 MG/1; MG/1
1 TABLET ORAL EVERY 4 HOURS PRN
Qty: 6 TABLET | Refills: 0 | Status: SHIPPED | OUTPATIENT
Start: 2017-08-23 | End: 2017-09-06

## 2017-08-23 RX ADMIN — CLINDAMYCIN PHOSPHATE 600 MG: 150 INJECTION, SOLUTION INTRAMUSCULAR; INTRAVENOUS at 01:35

## 2017-08-23 RX ADMIN — HYDROCODONE BITARTRATE AND ACETAMINOPHEN 1 TABLET: 7.5; 325 TABLET ORAL at 01:35

## 2017-08-23 NOTE — DISCHARGE INSTRUCTIONS
Strict skin care.  Do not submerge.  Clean with peroxide minimum of twice daily and apply the prescription strength ointment as well.  Antibiotics as directed.  Return to the emergency department as needed for worsening symptoms or concerns.  Thank you

## 2017-08-23 NOTE — ED PROVIDER NOTES
Subjective   HPI Comments: Patient presents to the emergency department with complaint of unusual pain at the site of a suture repair the left second toe.  Further history reveals that the patient sustained a laceration to the second toe yesterday; patient unsure on what objects she lacerated her toe but suspects it was a contaminated objects simply because she was taking out the trash the time.  She sought care at another local emergency department where an x-ray was performed and sutures were applied.  Patient states that her toe is hurting out of proportion to her expectations tonight.  She's had no fever or vomiting.    Patient is a 27 y.o. female presenting with wound check.   History provided by:  Patient   used: No    Wound Check   Location:  2nd toe,  Quality:  Pain at the site  Severity:  Moderate  Onset quality:  Gradual  Duration:  24 hours  Timing:  Constant  Progression:  Worsening  Chronicity:  New  Context:  Laceration repair performed yesterday at  ER.    Associated symptoms: no abdominal pain, no fever, no rash and no vomiting        Review of Systems   Constitutional: Negative for fever.   Gastrointestinal: Negative for abdominal pain and vomiting.   Musculoskeletal:        LEFT FOOT:  Healing sutured laceration on the 2nd toe      Skin: Positive for wound (2nd left toe). Negative for pallor and rash.   All other systems reviewed and are negative.      Past Medical History:   Diagnosis Date   • Anxiety    • Anxiety    • Asthma    • Depression        Allergies   Allergen Reactions   • Klonopin [Clonazepam] Other (See Comments)     Tingling tongue.        Past Surgical History:   Procedure Laterality Date   •  SECTION         Family History   Problem Relation Age of Onset   • Bipolar disorder Mother    • Panic disorder Mother        Social History     Social History   • Marital status:      Spouse name: N/A   • Number of children: N/A   • Years of education: N/A  "    Social History Main Topics   • Smoking status: Current Every Day Smoker     Packs/day: 1.00   • Smokeless tobacco: None   • Alcohol use No   • Drug use: No   • Sexual activity: Defer     Other Topics Concern   • None     Social History Narrative   • None           Objective   Physical Exam   Constitutional: She appears well-developed and well-nourished. No distress.   HENT:   Head: Normocephalic and atraumatic.   Cardiovascular: Normal rate and regular rhythm.    Pulmonary/Chest: Effort normal. No respiratory distress.   Musculoskeletal: Normal range of motion. She exhibits no edema or deformity.     LEFT FOOT:  There is a repaired and sutured laceration on the plantar surface of the 2nd toe.  Edges are well approximated.  NO STS or erythema or warmth to the dorsal nor plantar surfaces.  NV exam is negative.     Neurological: She exhibits normal muscle tone.   Skin: Skin is warm and dry. No rash noted. She is not diaphoretic. No erythema. No pallor.   Psychiatric: Thought content normal.   Patient is anxious         Procedures         ED Course  ED Course      No results found for this or any previous visit (from the past 24 hour(s)).  Note: In addition to lab results from this visit, the labs listed above may include labs taken at another facility or during a different encounter within the last 24 hours. Please correlate lab times with ED admission and discharge times for further clarification of the services performed during this visit.    No orders to display     Vitals:    08/23/17 0000   BP: 121/59   BP Location: Left arm   Patient Position: Sitting   Pulse: 106   Resp: 16   Temp: 97.8 °F (36.6 °C)   SpO2: 100%   Weight: 120 lb (54.4 kg)   Height: 67\" (170.2 cm)     Medications   clindamycin (CLEOCIN) injection 600 mg (600 mg Intramuscular Given 8/23/17 0135)   HYDROcodone-acetaminophen (NORCO) 7.5-325 MG per tablet 1 tablet (1 tablet Oral Given 8/23/17 0135)     ECG/EMG Results (last 24 hours)     ** No " results found for the last 24 hours. **                    [facility]    Final diagnoses:   Laceration of toe of left foot, subsequent encounter            Claritza Mariya Maikol, APRN  08/23/17 7445

## 2017-08-24 ENCOUNTER — OFFICE VISIT (OUTPATIENT)
Dept: FAMILY MEDICINE CLINIC | Facility: CLINIC | Age: 27
End: 2017-08-24

## 2017-08-24 VITALS
HEIGHT: 67 IN | TEMPERATURE: 97.8 F | SYSTOLIC BLOOD PRESSURE: 110 MMHG | HEART RATE: 74 BPM | DIASTOLIC BLOOD PRESSURE: 70 MMHG | BODY MASS INDEX: 18.46 KG/M2 | WEIGHT: 117.6 LBS | RESPIRATION RATE: 16 BRPM | OXYGEN SATURATION: 99 %

## 2017-08-24 DIAGNOSIS — R52 PAIN: ICD-10-CM

## 2017-08-24 DIAGNOSIS — N39.0 URINARY TRACT INFECTION, SITE UNSPECIFIED: Primary | ICD-10-CM

## 2017-08-24 DIAGNOSIS — R11.0 NAUSEA: ICD-10-CM

## 2017-08-24 PROCEDURE — 99214 OFFICE O/P EST MOD 30 MIN: CPT | Performed by: PHYSICIAN ASSISTANT

## 2017-08-24 PROCEDURE — 96372 THER/PROPH/DIAG INJ SC/IM: CPT | Performed by: PHYSICIAN ASSISTANT

## 2017-08-24 RX ORDER — CEFTRIAXONE 1 G/1
1 INJECTION, POWDER, FOR SOLUTION INTRAMUSCULAR; INTRAVENOUS EVERY 24 HOURS
Status: DISCONTINUED | OUTPATIENT
Start: 2017-08-24 | End: 2019-06-19

## 2017-08-24 RX ORDER — ONDANSETRON 8 MG/1
8 TABLET, ORALLY DISINTEGRATING ORAL EVERY 8 HOURS PRN
Qty: 12 TABLET | Refills: 1 | Status: SHIPPED | OUTPATIENT
Start: 2017-08-24 | End: 2017-09-06

## 2017-08-24 RX ORDER — PROMETHAZINE HYDROCHLORIDE 25 MG/ML
25 INJECTION, SOLUTION INTRAMUSCULAR; INTRAVENOUS ONCE
Status: COMPLETED | OUTPATIENT
Start: 2017-08-24 | End: 2017-08-24

## 2017-08-24 RX ORDER — KETOROLAC TROMETHAMINE 30 MG/ML
60 INJECTION, SOLUTION INTRAMUSCULAR; INTRAVENOUS ONCE
Status: COMPLETED | OUTPATIENT
Start: 2017-08-24 | End: 2017-08-24

## 2017-08-24 RX ADMIN — KETOROLAC TROMETHAMINE 60 MG: 30 INJECTION, SOLUTION INTRAMUSCULAR; INTRAVENOUS at 09:20

## 2017-08-24 RX ADMIN — PROMETHAZINE HYDROCHLORIDE 25 MG: 25 INJECTION, SOLUTION INTRAMUSCULAR; INTRAVENOUS at 09:24

## 2017-08-24 RX ADMIN — CEFTRIAXONE 1 G: 1 INJECTION, POWDER, FOR SOLUTION INTRAMUSCULAR; INTRAVENOUS at 09:19

## 2017-08-24 NOTE — PROGRESS NOTES
Subjective   Pretty Merritt is a 27 y.o. female    History of Present Illness  Patient comes in today for ER follow-up and for further evaluation of ongoing nausea, vomiting and foot pain.  She states that she caught her foot on Monday evening on some outdoor wood.  She went to the ER  that evening and had her toe sutured.  She states that after the ER visit she began to feel nauseated and had difficulty keeping foods down with vomiting.  She states she went to the ER at [facility] next evening.  She states she presented to the ER because she was continued have foot pain and had been experiencing nausea vomiting symptoms since the previous evening.  She states that they started her on an antibiotic with the concerns that possibly her foot was infected causing her symptoms of nausea and vomiting.  She states they also gave her prescription for Tylenol No. 4 with codeine.  She states she has been vomiting and has not been able to keep the medications down.  She states her pain in her toe is a 9-1/2 out of 10.  She states she has lost 3 pounds in the last 3 days because of inability to eat.  She states she cannot bear weight on her foot because of the pain in her toe.  No drainage from the toe noted.  Sutures are in place.  Patient states she is concerned that possibly she has urinary tract infection causing her nausea and vomiting, she has experienced this in the past.  She denies any pain with urination but states that her urine is dark and concentrated and she hasn't been able to urinate as frequently lately, she does admit to not being able to drink fluids either.  The following portions of the patient's history were reviewed and updated as appropriate: allergies, current medications, past social history and problem list    Review of Systems   Constitutional: Positive for unexpected weight change. Negative for fever.   Gastrointestinal: Positive for abdominal pain, constipation, nausea and vomiting.    Musculoskeletal: Positive for gait problem and myalgias.       Objective     Vitals:    08/24/17 0815   BP: 110/70   Pulse: 74   Resp: 16   Temp: 97.8 °F (36.6 °C)   SpO2: 99%       Physical Exam   Constitutional: She appears well-developed and well-nourished. No distress.   HENT:   Head: Normocephalic and atraumatic.   Eyes: Conjunctivae and EOM are normal. Pupils are equal, round, and reactive to light. No scleral icterus.   Pulmonary/Chest: Effort normal and breath sounds normal.   Abdominal: Soft. Bowel sounds are normal. She exhibits no distension and no mass. There is tenderness ( Mild midepigastric abdominal tenderness throughout, no rebound rigidity or guarding). There is no rebound and no guarding. No hernia.   Skin: Skin is warm and dry. She is not diaphoretic.   Sutures well placed, skin well approximated on second digit of left toe.  Nonerythematous, no drainage noted, neurovascular status intact in foot and toe that has been sutured.   Psychiatric: Her speech is normal and behavior is normal. Judgment and thought content normal. Her mood appears anxious. Cognition and memory are normal. She does not exhibit a depressed mood. She is attentive.       Assessment/Plan     Diagnoses and all orders for this visit:    Urinary tract infection, site unspecified  -     cefTRIAXone (ROCEPHIN) injection 1 g; Inject 1 g into the shoulder, thigh, or buttocks Daily.    Nausea  -     promethazine (PHENERGAN) injection 25 mg; Inject 1 mL into the shoulder, thigh, or buttocks 1 (One) Time.    Pain  -     ketorolac (TORADOL) injection 60 mg; Inject 60 mg into the shoulder, thigh, or buttocks 1 (One) Time.    Other orders  -     ondansetron ODT (ZOFRAN-ODT) 8 MG disintegrating tablet; Take 1 tablet by mouth Every 8 (Eight) Hours As Needed for Nausea or Vomiting.    Discussed with patient that without a urinary specimen I'll not be able to confirm or rule out UTI, Rocephin 1 g given to cover for possibility of infection,  I have asked her to discontinue her Bactrim as I do not know that she needs it and it may be adding to her nausea and vomiting.  I've also asked her to discontinue the Tylenol No. 4 as this could be adding to her nausea and vomiting.  I explained to her with a significant amount of pain in her foot and this could be upsetting her stomach also.  Shot of Phenergan, Toradol and Zofran all 3 given as noted above, prescription for Zofran, routine wound care recommended for site of laceration, follow-up for recheck if unimproved in 24 hours, go to ER if symptoms worsen acutely.  Keep follow-up as scheduled by ER for suture removal.

## 2017-08-29 ENCOUNTER — CLINICAL SUPPORT (OUTPATIENT)
Dept: FAMILY MEDICINE CLINIC | Facility: CLINIC | Age: 27
End: 2017-08-29

## 2017-08-29 DIAGNOSIS — Z30.42 ENCOUNTER FOR SURVEILLANCE OF INJECTABLE CONTRACEPTIVE: Primary | ICD-10-CM

## 2017-08-29 DIAGNOSIS — N91.2 AMENORRHEA: ICD-10-CM

## 2017-08-29 LAB
B-HCG UR QL: NEGATIVE
INTERNAL NEGATIVE CONTROL: NEGATIVE
INTERNAL POSITIVE CONTROL: POSITIVE
Lab: NORMAL

## 2017-08-29 PROCEDURE — 81025 URINE PREGNANCY TEST: CPT | Performed by: FAMILY MEDICINE

## 2017-08-29 RX ORDER — MEDROXYPROGESTERONE ACETATE 150 MG/ML
150 INJECTION, SUSPENSION INTRAMUSCULAR ONCE
Status: DISCONTINUED | OUTPATIENT
Start: 2017-08-29 | End: 2017-11-10 | Stop reason: SDUPTHER

## 2017-09-05 RX ORDER — DEXTROMETHORPHAN HYDROBROMIDE AND PROMETHAZINE HYDROCHLORIDE 15; 6.25 MG/5ML; MG/5ML
SYRUP ORAL
Refills: 0 | OUTPATIENT
Start: 2017-09-05

## 2017-09-06 ENCOUNTER — OFFICE VISIT (OUTPATIENT)
Dept: FAMILY MEDICINE CLINIC | Facility: CLINIC | Age: 27
End: 2017-09-06

## 2017-09-06 VITALS
OXYGEN SATURATION: 99 % | TEMPERATURE: 98.6 F | HEART RATE: 78 BPM | HEIGHT: 67 IN | SYSTOLIC BLOOD PRESSURE: 116 MMHG | RESPIRATION RATE: 16 BRPM | DIASTOLIC BLOOD PRESSURE: 72 MMHG | WEIGHT: 118.6 LBS | BODY MASS INDEX: 18.62 KG/M2

## 2017-09-06 DIAGNOSIS — S91.312D LACERATION OF LEFT FOOT, SUBSEQUENT ENCOUNTER: Primary | ICD-10-CM

## 2017-09-06 PROCEDURE — 99212 OFFICE O/P EST SF 10 MIN: CPT | Performed by: PHYSICIAN ASSISTANT

## 2017-09-06 NOTE — PROGRESS NOTES
Subjective   Pretty Merritt is a 27 y.o. female    History of Present Illness  Patient comes in today for suture removal on right foot, see previous office notes, feeling well.  The following portions of the patient's history were reviewed and updated as appropriate: allergies, current medications, past social history and problem list    Review of Systems   Skin: Positive for wound.       Objective     Vitals:    09/06/17 0802   BP: 116/72   Pulse: 78   Resp: 16   Temp: 98.6 °F (37 °C)   SpO2: 99%       Physical Exam   Constitutional: She appears well-developed and well-nourished. No distress.   Skin: Skin is warm and dry. No rash noted. She is not diaphoretic. No erythema. No pallor.   5 sutures removed, skin well approximated, Steri-Strips applied, patient tolerated well.  Routine wound care discussed and given.   Nursing note and vitals reviewed.      Assessment/Plan     Diagnoses and all orders for this visit:    Laceration of left foot, subsequent encounter

## 2017-09-08 RX ORDER — DEXTROMETHORPHAN HYDROBROMIDE AND PROMETHAZINE HYDROCHLORIDE 15; 6.25 MG/5ML; MG/5ML
SYRUP ORAL
Qty: 120 ML | Refills: 0 | Status: SHIPPED | OUTPATIENT
Start: 2017-09-08 | End: 2017-11-10

## 2017-11-10 ENCOUNTER — OFFICE VISIT (OUTPATIENT)
Dept: FAMILY MEDICINE CLINIC | Facility: CLINIC | Age: 27
End: 2017-11-10

## 2017-11-10 VITALS
WEIGHT: 114 LBS | RESPIRATION RATE: 14 BRPM | BODY MASS INDEX: 17.89 KG/M2 | HEART RATE: 76 BPM | HEIGHT: 67 IN | OXYGEN SATURATION: 100 % | TEMPERATURE: 98.1 F | SYSTOLIC BLOOD PRESSURE: 110 MMHG | DIASTOLIC BLOOD PRESSURE: 60 MMHG

## 2017-11-10 DIAGNOSIS — Z72.0 TOBACCO ABUSE: ICD-10-CM

## 2017-11-10 DIAGNOSIS — F41.9 ANXIETY: Primary | ICD-10-CM

## 2017-11-10 DIAGNOSIS — J45.20 MILD INTERMITTENT ASTHMA WITHOUT COMPLICATION: ICD-10-CM

## 2017-11-10 PROBLEM — F42.9 OCD (OBSESSIVE COMPULSIVE DISORDER): Status: ACTIVE | Noted: 2017-11-10

## 2017-11-10 PROBLEM — F32.A DEPRESSION: Status: ACTIVE | Noted: 2017-11-10

## 2017-11-10 PROCEDURE — 99213 OFFICE O/P EST LOW 20 MIN: CPT | Performed by: PHYSICIAN ASSISTANT

## 2017-11-10 NOTE — PROGRESS NOTES
Subjective   Pretty Merritt is a 27 y.o. female    History of Present Illness  Patient comes in today for follow-up on generalized anxiety disorder.  She is due for refills on Xanax that she takes 1 mg 3 times daily.  She states she has had a lot of stress recently.  She actually just got out of intermediate this morning.  She states she was in intermediate for a charge of receiving stolen property.  No violent charges or drug charges were associated with this.  She has no history of drug abuse.  Her Wil is appropriate and her UDS has been appropriate in the past.  She states she did not receive her Xanax when she was in intermediate she is feeling very anxious.  She just got out of intermediate this morning.  She states that she has been able to cut back on her cigarettes and plans on quitting smoking now.  She initially lost her job because of this but they have reinstated her and she starts back to work tomorrow.  She has no homicidal or suicidal ideations.  She states she was released with 2 years probation where she will need to check in with the  weekly.  Asthma is currently stable.  The following portions of the patient's history were reviewed and updated as appropriate: allergies, current medications, past social history and problem list    Review of Systems   Constitutional: Negative for appetite change and fatigue.   Respiratory: Negative for chest tightness and shortness of breath.    Gastrointestinal: Negative for abdominal pain, diarrhea and nausea.   Neurological: Negative for dizziness, tremors, weakness, light-headedness and headaches.   Psychiatric/Behavioral: Positive for dysphoric mood and sleep disturbance. Negative for agitation, behavioral problems, confusion, decreased concentration and suicidal ideas. The patient is nervous/anxious.        Objective     Vitals:    11/10/17 0918   BP: 110/60   Pulse: 76   Resp: 14   Temp: 98.1 °F (36.7 °C)   SpO2: 100%       Physical Exam   Constitutional: She is  oriented to person, place, and time. She appears well-developed and well-nourished.   Neck: No thyroid mass and no thyromegaly present.   Cardiovascular: Normal rate, regular rhythm and normal heart sounds.    Pulmonary/Chest: Effort normal.   Neurological: She is alert and oriented to person, place, and time.   Psychiatric: Her speech is normal and behavior is normal. Judgment and thought content normal. Her mood appears anxious ( Mildly anxious). Her affect is not angry and not inappropriate. Cognition and memory are normal. She does not exhibit a depressed mood. She is attentive.   Nursing note and vitals reviewed.      Assessment/Plan     Diagnoses and all orders for this visit:    Anxiety    Mild intermittent asthma without complication    Tobacco abuse    Refilled Xanax 0.5 mg 1 3 times a day #90 with 2 refills.  Discussed abuse potential this medication, follow-up in 3 months for recheck.

## 2017-12-04 ENCOUNTER — TELEPHONE (OUTPATIENT)
Dept: FAMILY MEDICINE CLINIC | Facility: CLINIC | Age: 27
End: 2017-12-04

## 2017-12-04 DIAGNOSIS — Z30.013 ENCOUNTER FOR INITIAL PRESCRIPTION OF INJECTABLE CONTRACEPTIVE: ICD-10-CM

## 2017-12-04 RX ORDER — MEDROXYPROGESTERONE ACETATE 150 MG/ML
150 INJECTION, SUSPENSION INTRAMUSCULAR
Qty: 1 ML | Refills: 4 | OUTPATIENT
Start: 2017-12-04 | End: 2019-06-19

## 2017-12-04 NOTE — TELEPHONE ENCOUNTER
----- Message from Alba Campos sent at 12/4/2017  1:18 PM EST -----  Contact: patient  Needs a refill called in today, needs to bring with her to appointment tomorrow.    medroxyPROGESTERone (DEPO-PROVERA) 150 MG/ML injection 1 mL 4   Sig - Route: Inject 1 mL into the shoulder, thigh, or buttocks Every 3 (Three) Months. - [facility] [address] [place] [address] - [phone]  - [phone]  [phone] (Phone)  [phone] (Fax)

## 2017-12-05 ENCOUNTER — APPOINTMENT (OUTPATIENT)
Dept: LAB | Facility: HOSPITAL | Age: 27
End: 2017-12-05

## 2017-12-05 ENCOUNTER — OFFICE VISIT (OUTPATIENT)
Dept: FAMILY MEDICINE CLINIC | Facility: CLINIC | Age: 27
End: 2017-12-05

## 2017-12-05 VITALS
BODY MASS INDEX: 18.21 KG/M2 | HEART RATE: 62 BPM | TEMPERATURE: 98.4 F | WEIGHT: 116 LBS | OXYGEN SATURATION: 98 % | DIASTOLIC BLOOD PRESSURE: 64 MMHG | HEIGHT: 67 IN | SYSTOLIC BLOOD PRESSURE: 118 MMHG

## 2017-12-05 DIAGNOSIS — B96.89 BV (BACTERIAL VAGINOSIS): ICD-10-CM

## 2017-12-05 DIAGNOSIS — R35.0 URINARY FREQUENCY: Primary | ICD-10-CM

## 2017-12-05 DIAGNOSIS — N76.0 BV (BACTERIAL VAGINOSIS): ICD-10-CM

## 2017-12-05 DIAGNOSIS — R05.9 COUGH: ICD-10-CM

## 2017-12-05 LAB
BILIRUB BLD-MCNC: NEGATIVE MG/DL
CLARITY, POC: CLEAR
COLOR UR: YELLOW
GLUCOSE UR STRIP-MCNC: NEGATIVE MG/DL
KETONES UR QL: NEGATIVE
LEUKOCYTE EST, POC: NEGATIVE
NITRITE UR-MCNC: NEGATIVE MG/ML
PH UR: 6 [PH] (ref 5–8)
PROT UR STRIP-MCNC: NEGATIVE MG/DL
RBC # UR STRIP: NEGATIVE /UL
SP GR UR: 1.02 (ref 1–1.03)
UROBILINOGEN UR QL: NORMAL

## 2017-12-05 PROCEDURE — 87591 N.GONORRHOEAE DNA AMP PROB: CPT | Performed by: PHYSICIAN ASSISTANT

## 2017-12-05 PROCEDURE — 87491 CHLMYD TRACH DNA AMP PROBE: CPT | Performed by: PHYSICIAN ASSISTANT

## 2017-12-05 PROCEDURE — 99214 OFFICE O/P EST MOD 30 MIN: CPT | Performed by: PHYSICIAN ASSISTANT

## 2017-12-05 RX ORDER — PERMETHRIN 50 MG/G
CREAM TOPICAL ONCE
Qty: 60 G | Refills: 1 | Status: SHIPPED | OUTPATIENT
Start: 2017-12-05 | End: 2017-12-05

## 2017-12-05 RX ORDER — DEXTROMETHORPHAN HYDROBROMIDE AND PROMETHAZINE HYDROCHLORIDE 15; 6.25 MG/5ML; MG/5ML
5 SYRUP ORAL 4 TIMES DAILY PRN
Qty: 118 ML | Refills: 1 | OUTPATIENT
Start: 2017-12-05 | End: 2019-06-19

## 2017-12-05 RX ORDER — METRONIDAZOLE 500 MG/1
500 TABLET ORAL 3 TIMES DAILY
Qty: 15 TABLET | Refills: 0 | OUTPATIENT
Start: 2017-12-05 | End: 2019-06-19

## 2017-12-05 NOTE — PROGRESS NOTES
Subjective   Pretty Merritt is a 27 y.o. female    History of Present Illness  Patient comes in today inserted with abnormal vaginal discharge with a foul odor.  She states she's noticed in the past few days.  She's had a little bit of irritation she urinates also.  She does have a new sexual partner.  Additionally she feels like she's had a recurrence of scabies and requests a refill of Elimite.  Lastly patient request a prescription cough medicine she states she's had some clear congestion with dry cough and no fever.  The following portions of the patient's history were reviewed and updated as appropriate: allergies, current medications, past social history and problem list    Review of Systems   Constitutional: Negative for chills and fever.   HENT: Positive for congestion.    Respiratory: Positive for cough.    Gastrointestinal: Negative for abdominal pain, nausea and vomiting.   Genitourinary: Positive for dysuria, pelvic pain ( Slight pelvic discomfort this week) and vaginal discharge. Negative for difficulty urinating, frequency, genital sores, hematuria, urgency, vaginal bleeding and vaginal pain.   Skin: Positive for rash.       Objective     Vitals:    12/05/17 1151   BP: 118/64   Pulse: 62   Temp: 98.4 °F (36.9 °C)   SpO2: 98%       Physical Exam   Constitutional: She appears well-developed and well-nourished.   HENT:   Head: Normocephalic and atraumatic.   Nose: Nose normal.   Mouth/Throat: Oropharynx is clear and moist.   Pulmonary/Chest: Effort normal and breath sounds normal.   Abdominal: Soft. She exhibits no distension. There is no tenderness. There is no rebound and no guarding.   Skin: Skin is warm and dry. Rash noted.   3-4 small areas of excoriation right wrist   Nursing note and vitals reviewed.      Assessment/Plan     Diagnoses and all orders for this visit:    Urinary frequency  -     POC Urinalysis Dipstick, Automated  -     Chlamydia trachomatis, Neisseria gonorrhoeae, PCR - Urine,  Urine, Clean Catch    BV (bacterial vaginosis)  -     metroNIDAZOLE (FLAGYL) 500 MG tablet; Take 1 tablet (500 mg total) by mouth 3 (Three) Times a Day    Cough  -     promethazine-dextromethorphan (PROMETHAZINE-DM) 6.25-15 MG/5ML syrup; Take 5 mL by mouth 4 (Four) Times a Day As Needed for Cough    Other orders  -     permethrin (ELIMITE) 5 % cream; Apply topically 1 (One) Time for 1 dose

## 2017-12-07 LAB
C TRACH RRNA SPEC DONR QL NAA+PROBE: NEGATIVE
N GONORRHOEA DNA SPEC QL NAA+PROBE: NEGATIVE

## 2018-09-18 NOTE — DISCHARGE INSTRUCTIONS
CLear liquid diet until abdominal cramps better and vomiting ceases. Advance diet as tolerated   (3) no apparent problem

## 2018-09-20 ENCOUNTER — HOSPITAL ENCOUNTER (EMERGENCY)
Facility: HOSPITAL | Age: 28
Discharge: LEFT BEFORE DISCHARGE | End: 2018-09-20

## 2018-09-20 ENCOUNTER — APPOINTMENT (OUTPATIENT)
Dept: GENERAL RADIOLOGY | Facility: HOSPITAL | Age: 28
End: 2018-09-20

## 2018-09-20 VITALS
SYSTOLIC BLOOD PRESSURE: 120 MMHG | HEIGHT: 67 IN | HEART RATE: 95 BPM | WEIGHT: 120 LBS | RESPIRATION RATE: 22 BRPM | BODY MASS INDEX: 18.83 KG/M2 | OXYGEN SATURATION: 97 % | DIASTOLIC BLOOD PRESSURE: 69 MMHG | TEMPERATURE: 98.5 F

## 2018-09-20 PROCEDURE — 93005 ELECTROCARDIOGRAM TRACING: CPT

## 2018-09-20 PROCEDURE — 99211 OFF/OP EST MAY X REQ PHY/QHP: CPT

## 2019-04-24 ENCOUNTER — HOSPITAL ENCOUNTER (EMERGENCY)
Facility: HOSPITAL | Age: 29
Discharge: HOME | End: 2019-04-24
Attending: EMERGENCY MEDICINE | Admitting: EMERGENCY MEDICINE

## 2019-04-24 VITALS
SYSTOLIC BLOOD PRESSURE: 142 MMHG | BODY MASS INDEX: 18.83 KG/M2 | DIASTOLIC BLOOD PRESSURE: 68 MMHG | HEART RATE: 89 BPM | RESPIRATION RATE: 17 BRPM | HEIGHT: 67 IN | OXYGEN SATURATION: 98 % | TEMPERATURE: 98.4 F | WEIGHT: 120 LBS

## 2019-04-24 DIAGNOSIS — K04.7 DENTAL INFECTION: ICD-10-CM

## 2019-04-24 DIAGNOSIS — K08.89 PAIN, DENTAL: Primary | ICD-10-CM

## 2019-04-24 PROCEDURE — 99283 EMERGENCY DEPT VISIT LOW MDM: CPT

## 2019-04-24 RX ORDER — AMOXICILLIN 500 MG/1
500 CAPSULE ORAL 3 TIMES DAILY
Qty: 30 CAPSULE | Refills: 0 | OUTPATIENT
Start: 2019-04-24 | End: 2019-06-19

## 2019-04-24 RX ORDER — TRAMADOL HYDROCHLORIDE 50 MG/1
50 TABLET ORAL EVERY 6 HOURS PRN
Qty: 12 TABLET | Refills: 0 | OUTPATIENT
Start: 2019-04-24 | End: 2019-06-19

## 2019-06-19 PROCEDURE — 87661 TRICHOMONAS VAGINALIS AMPLIF: CPT | Performed by: NURSE PRACTITIONER

## 2019-06-19 PROCEDURE — 87591 N.GONORRHOEAE DNA AMP PROB: CPT | Performed by: NURSE PRACTITIONER

## 2019-06-19 PROCEDURE — 87491 CHLMYD TRACH DNA AMP PROBE: CPT | Performed by: NURSE PRACTITIONER

## 2019-06-24 ENCOUNTER — TELEPHONE (OUTPATIENT)
Dept: URGENT CARE | Facility: CLINIC | Age: 29
End: 2019-06-24

## 2019-06-24 NOTE — TELEPHONE ENCOUNTER
Notified Ms. Merritt of STD results, states she is improving. Advised to follow up with PCP/GYN if symptoms return, voiced understanding.

## 2019-08-21 ENCOUNTER — TELEPHONE (OUTPATIENT)
Dept: INTERNAL MEDICINE | Facility: CLINIC | Age: 29
End: 2019-08-21

## 2019-08-21 NOTE — TELEPHONE ENCOUNTER
PT LEFT HER NEW PT APPOINTMENT WITHOUT BEING SEEN.    HER APPT WAS SCHEDULED FOR 12:30 AND SHE WAS TOLD TO ARRIVE 30 MINUTES EARLY TO COMPLETED NEW PT PAPERWORK AND VERIFY INSURANCE.    PT ARRIVED AND SIGNED IN AT 11:17AM. SHE WAS CHECKED IN AT 11:34AM.  AT 12:14PM, SHE CAME TO THE WINDOW AND ASKED HOW MUCH LONGER SHE WAS GOING TO HAVE TO WAIT. I EXPLAINED TO THE PT THAT HER APPT WAS SCHEDULED FOR 12:30 AND EXPLAINED TO HER WHY SHE WAS ASKED TO ARRIVE 30 MINUTES EARLY.     PT LEFT AND IGNORED ME WHEN I ASKED IF SHE WAS LEAVING FOR GOOD OR IF SHE WOULD BE COMING BACK.

## 2021-11-17 ENCOUNTER — OFFICE VISIT (OUTPATIENT)
Dept: INTERNAL MEDICINE | Facility: CLINIC | Age: 31
End: 2021-11-17

## 2021-11-17 DIAGNOSIS — Z53.21 PATIENT LEFT WITHOUT BEING SEEN: Primary | ICD-10-CM

## 2021-11-19 NOTE — PROGRESS NOTES
The pt canceled the visit before the appointment but an encounter was still created so I am closing the encounter.

## 2021-11-22 ENCOUNTER — OFFICE VISIT (OUTPATIENT)
Dept: INTERNAL MEDICINE | Facility: CLINIC | Age: 31
End: 2021-11-22

## 2021-11-22 ENCOUNTER — LAB (OUTPATIENT)
Dept: LAB | Facility: HOSPITAL | Age: 31
End: 2021-11-22

## 2021-11-22 VITALS
WEIGHT: 144.6 LBS | OXYGEN SATURATION: 98 % | RESPIRATION RATE: 20 BRPM | HEIGHT: 65 IN | BODY MASS INDEX: 24.09 KG/M2 | DIASTOLIC BLOOD PRESSURE: 66 MMHG | SYSTOLIC BLOOD PRESSURE: 114 MMHG | HEART RATE: 94 BPM | TEMPERATURE: 97.1 F

## 2021-11-22 DIAGNOSIS — R30.0 DYSURIA: ICD-10-CM

## 2021-11-22 DIAGNOSIS — F41.1 GENERALIZED ANXIETY DISORDER: ICD-10-CM

## 2021-11-22 DIAGNOSIS — N30.01 ACUTE CYSTITIS WITH HEMATURIA: ICD-10-CM

## 2021-11-22 DIAGNOSIS — Z30.42 ENCOUNTER FOR SURVEILLANCE OF INJECTABLE CONTRACEPTIVE: ICD-10-CM

## 2021-11-22 DIAGNOSIS — J45.20 MILD INTERMITTENT ASTHMA, UNSPECIFIED WHETHER COMPLICATED: ICD-10-CM

## 2021-11-22 DIAGNOSIS — F31.9 BIPOLAR 1 DISORDER (HCC): Primary | ICD-10-CM

## 2021-11-22 DIAGNOSIS — F43.10 PTSD (POST-TRAUMATIC STRESS DISORDER): ICD-10-CM

## 2021-11-22 DIAGNOSIS — Z23 NEED FOR VACCINATION: ICD-10-CM

## 2021-11-22 DIAGNOSIS — N76.0 ACUTE VAGINITIS: ICD-10-CM

## 2021-11-22 LAB
B-HCG UR QL: NEGATIVE
BILIRUB BLD-MCNC: ABNORMAL MG/DL
CLARITY, POC: ABNORMAL
COLOR UR: YELLOW
EXPIRATION DATE: ABNORMAL
EXPIRATION DATE: NORMAL
GLUCOSE UR STRIP-MCNC: NEGATIVE MG/DL
INTERNAL NEGATIVE CONTROL: NORMAL
INTERNAL POSITIVE CONTROL: NORMAL
KETONES UR QL: ABNORMAL
LEUKOCYTE EST, POC: NEGATIVE
Lab: ABNORMAL
Lab: NORMAL
NITRITE UR-MCNC: NEGATIVE MG/ML
PH UR: 6 [PH] (ref 5–8)
PROT UR STRIP-MCNC: ABNORMAL MG/DL
RBC # UR STRIP: ABNORMAL /UL
SP GR UR: 1.02 (ref 1–1.03)
UROBILINOGEN UR QL: NORMAL

## 2021-11-22 PROCEDURE — 90471 IMMUNIZATION ADMIN: CPT | Performed by: PHYSICIAN ASSISTANT

## 2021-11-22 PROCEDURE — 87086 URINE CULTURE/COLONY COUNT: CPT | Performed by: PHYSICIAN ASSISTANT

## 2021-11-22 PROCEDURE — 87147 CULTURE TYPE IMMUNOLOGIC: CPT | Performed by: PHYSICIAN ASSISTANT

## 2021-11-22 PROCEDURE — 90732 PPSV23 VACC 2 YRS+ SUBQ/IM: CPT | Performed by: PHYSICIAN ASSISTANT

## 2021-11-22 PROCEDURE — 99214 OFFICE O/P EST MOD 30 MIN: CPT | Performed by: PHYSICIAN ASSISTANT

## 2021-11-22 PROCEDURE — 81025 URINE PREGNANCY TEST: CPT | Performed by: PHYSICIAN ASSISTANT

## 2021-11-22 RX ORDER — BUDESONIDE AND FORMOTEROL FUMARATE DIHYDRATE 160; 4.5 UG/1; UG/1
2 AEROSOL RESPIRATORY (INHALATION)
Qty: 10.2 G | Refills: 2 | Status: SHIPPED | OUTPATIENT
Start: 2021-11-22

## 2021-11-22 RX ORDER — MEDROXYPROGESTERONE ACETATE 150 MG/ML
150 INJECTION, SUSPENSION INTRAMUSCULAR
Qty: 1 ML | Refills: 2 | Status: SHIPPED | OUTPATIENT
Start: 2021-11-22

## 2021-11-22 RX ORDER — NITROFURANTOIN 25; 75 MG/1; MG/1
100 CAPSULE ORAL 2 TIMES DAILY
Qty: 14 CAPSULE | Refills: 0 | Status: SHIPPED | OUTPATIENT
Start: 2021-11-22

## 2021-11-22 RX ORDER — PRAZOSIN HYDROCHLORIDE 1 MG/1
1 CAPSULE ORAL NIGHTLY
Qty: 30 CAPSULE | Refills: 1 | Status: SHIPPED | OUTPATIENT
Start: 2021-11-22 | End: 2022-08-31 | Stop reason: SDUPTHER

## 2021-11-22 RX ORDER — FLUCONAZOLE 150 MG/1
150 TABLET ORAL ONCE
Qty: 1 TABLET | Refills: 0 | Status: SHIPPED | OUTPATIENT
Start: 2021-11-22 | End: 2021-11-22

## 2021-11-22 RX ORDER — QUETIAPINE FUMARATE 200 MG/1
200 TABLET, FILM COATED ORAL 2 TIMES DAILY
Qty: 60 TABLET | Refills: 1 | Status: SHIPPED | OUTPATIENT
Start: 2021-11-22 | End: 2022-08-31 | Stop reason: SDUPTHER

## 2021-11-22 RX ORDER — ESCITALOPRAM OXALATE 20 MG/1
20 TABLET ORAL DAILY
Qty: 30 TABLET | Refills: 1 | Status: SHIPPED | OUTPATIENT
Start: 2021-11-22 | End: 2022-08-31 | Stop reason: SDUPTHER

## 2021-11-22 RX ORDER — LAMOTRIGINE 25 MG/1
50 TABLET ORAL DAILY
Qty: 60 TABLET | Refills: 1 | Status: SHIPPED | OUTPATIENT
Start: 2021-11-22 | End: 2022-08-31 | Stop reason: SDUPTHER

## 2021-11-22 NOTE — PROGRESS NOTES
Chief Complaint  Urinary Tract Infection    Subjective          History of Present Illness  Pretty Merritt presents to [facility] to establish care.  Bipolar, Anxiety, Depression, PTSD, insomnia:  Has been well controlled on multiple meds for a while now. Has hx of drug abuse and just got out of rehab, she was maintained on these meds through rehab as well. No recent HI/Si. Has been sleeping well with the seroquel, does not feel like she needs doxepin at this time. She is also on prazosin for nightmares which helps. She takes Lexapro and Lamictal every evening also. Takes seroquel BID. Does not have a psychiatrist any longer but used to see one.     UTI:  Has had some burning with urination and some strong smelling urine. No fever, no back pain, no nausea. Has had some vaginal discharge also, worried she has UTI or yeast infection. She does not have STDs, had recent STD screening.     Contraception:  Has heavy periods w/o depo. Is due for depo around [place] time.     Asthma:  Uses albuterol every day at least once a day sometimes twice. Used to be on symbicort but has not had a refill on that one in a while.       Review of Systems   Constitutional: Negative for fever and unexpected weight loss.   Respiratory: Negative for cough, shortness of breath and wheezing.    Cardiovascular: Negative for chest pain and palpitations.   Genitourinary: Positive for dysuria, frequency and urgency. Negative for flank pain.   Psychiatric/Behavioral: Negative for sleep disturbance, suicidal ideas and depressed mood. The patient is not nervous/anxious.        The following portions of the patient's history were reviewed and updated as appropriate: allergies, current medications, past family history, past medical history, past social history, past surgical history and problem list.    Allergies   Allergen Reactions   • Klonopin [Clonazepam] Other (See Comments)     Tingling tongue.      Current  Outpatient Medications on File Prior to Visit   Medication Sig Dispense Refill   • melatonin 5 MG tablet tablet      • Narcan 4 MG/0.1ML nasal spray      • nicotine (NICODERM CQ) 21 MG/24HR patch      • vitamin B-6 (PYRIDOXINE) 25 MG tablet      • Vivitrol 380 MG reconstituted suspension IM suspension      • [DISCONTINUED] Doxepin HCl 6 MG tablet      • [DISCONTINUED] escitalopram (LEXAPRO) 20 MG tablet      • [DISCONTINUED] lamoTRIgine (LaMICtal) 25 MG tablet      • [DISCONTINUED] medroxyPROGESTERone (DEPO-PROVERA) 150 MG/ML injection      • [DISCONTINUED] prazosin (MINIPRESS) 1 MG capsule      • [DISCONTINUED] ProAir  (90 Base) MCG/ACT inhaler      • [DISCONTINUED] QUEtiapine (SEROquel) 100 MG tablet      • [DISCONTINUED] QUEtiapine (SEROquel) 200 MG tablet      • [DISCONTINUED] ascorbic acid (VITAMIN C) 1000 MG tablet      • [DISCONTINUED] Cholecalciferol (Vitamin D3) 50 MCG (2000 UT) capsule      • [DISCONTINUED] dicyclomine (BENTYL) 10 MG capsule      • [DISCONTINUED] folic acid (FOLVITE) 1 MG tablet      • [DISCONTINUED] hydrOXYzine (ATARAX) 25 MG tablet      • [DISCONTINUED] magnesium oxide (MAG-OX) 400 MG tablet      • [DISCONTINUED] penicillin v potassium (VEETID) 500 MG tablet      • [DISCONTINUED] triamcinolone (KENALOG) 0.1 % cream        No current facility-administered medications on file prior to visit.     New Medications Ordered This Visit   Medications   • fluconazole (Diflucan) 150 MG tablet     Sig: Take 1 tablet by mouth 1 (One) Time for 1 dose.     Dispense:  1 tablet     Refill:  0   • lamoTRIgine (LaMICtal) 25 MG tablet     Sig: Take 2 tablets by mouth Daily.     Dispense:  60 tablet     Refill:  1   • prazosin (MINIPRESS) 1 MG capsule     Sig: Take 1 capsule by mouth Every Night.     Dispense:  30 capsule     Refill:  1   • escitalopram (LEXAPRO) 20 MG tablet     Sig: Take 1 tablet by mouth Daily.     Dispense:  30 tablet     Refill:  1   • QUEtiapine (SEROquel) 200 MG tablet     Sig:  Take 1 tablet by mouth 2 (Two) Times a Day.     Dispense:  60 tablet     Refill:  1   • medroxyPROGESTERone (DEPO-PROVERA) 150 MG/ML injection     Sig: Inject 1 mL into the appropriate muscle as directed by prescriber Every 3 (Three) Months.     Dispense:  1 mL     Refill:  2   • ProAir  (90 Base) MCG/ACT inhaler     Sig: Inhale 2 puffs Every 6 (Six) Hours As Needed for Wheezing.     Dispense:  18 g     Refill:  1   • budesonide-formoterol (Symbicort) 160-4.5 MCG/ACT inhaler     Sig: Inhale 2 puffs 2 (Two) Times a Day.     Dispense:  10.2 g     Refill:  2   • nitrofurantoin, macrocrystal-monohydrate, (Macrobid) 100 MG capsule     Sig: Take 1 capsule by mouth 2 (Two) Times a Day.     Dispense:  14 capsule     Refill:  0       Past Medical History:   Diagnosis Date   • Asthma    • Breathing problem    •  delivery delivered    •  delivery delivered    • Depression    • Headache    • Kidney infection    • Menstrual cycle problem    • Sleep apnea       Past Surgical History:   Procedure Laterality Date   •  SECTION      x 2. 2012, 2013   • TUBAL ABDOMINAL LIGATION     • WISDOM TOOTH EXTRACTION  2012      Family History   Problem Relation Age of Onset   • Bipolar disorder Mother    • Panic disorder Mother    • Mental illness Mother    • Other Mother         migraine headaches   • Arthritis Father    • Other Father         migraine headaches      Social History     Socioeconomic History   • Marital status: Single   Tobacco Use   • Smoking status: Former Smoker     Packs/day: 1.50     Types: Cigarettes     Quit date: 10/16/2021     Years since quittin.1   • Smokeless tobacco: Never Used   Vaping Use   • Vaping Use: Every day   Substance and Sexual Activity   • Alcohol use: No     Comment: former drinker 2021   • Drug use: Yes     Types: Marijuana     Comment: opiods stopped using 2021 - pain meds / marijuana    • Sexual activity: Defer        Objective  "  Vital Signs:   Vitals:    11/22/21 1500   BP: 114/66   Pulse: 94   Resp: 20   Temp: 97.1 °F (36.2 °C)   TempSrc: Temporal   SpO2: 98%   Weight: 65.6 kg (144 lb 9.6 oz)   Height: 165.7 cm (65.25\")   PainSc: 0-No pain      Body mass index is 23.88 kg/m².  Physical Exam  Vitals reviewed.   Constitutional:       General: She is not in acute distress.     Appearance: Normal appearance.   HENT:      Head: Normocephalic and atraumatic.   Eyes:      General: No scleral icterus.     Extraocular Movements: Extraocular movements intact.      Conjunctiva/sclera: Conjunctivae normal.   Cardiovascular:      Rate and Rhythm: Normal rate and regular rhythm.      Heart sounds: Normal heart sounds. No murmur heard.      Pulmonary:      Effort: Pulmonary effort is normal. No respiratory distress.      Breath sounds: Normal breath sounds. No stridor. No wheezing or rhonchi.   Abdominal:      Tenderness: There is no right CVA tenderness or left CVA tenderness.   Musculoskeletal:      Cervical back: Normal range of motion and neck supple.   Skin:     General: Skin is warm and dry.      Coloration: Skin is not jaundiced.   Neurological:      General: No focal deficit present.      Mental Status: She is alert and oriented to person, place, and time.      Gait: Gait normal.   Psychiatric:         Mood and Affect: Mood normal.         Behavior: Behavior normal.          Result Review :                   Assessment and Plan    Diagnoses and all orders for this visit:    1. Bipolar 1 disorder (HCC) (Primary)  -     lamoTRIgine (LaMICtal) 25 MG tablet; Take 2 tablets by mouth Daily.  Dispense: 60 tablet; Refill: 1  -     QUEtiapine (SEROquel) 200 MG tablet; Take 1 tablet by mouth 2 (Two) Times a Day.  Dispense: 60 tablet; Refill: 1  -     Cancel: Ambulatory Referral to Psychiatry  -     Ambulatory Referral to Psychiatry    2. Generalized anxiety disorder  -     escitalopram (LEXAPRO) 20 MG tablet; Take 1 tablet by mouth Daily.  Dispense: 30 " tablet; Refill: 1    3. PTSD (post-traumatic stress disorder)  -     prazosin (MINIPRESS) 1 MG capsule; Take 1 capsule by mouth Every Night.  Dispense: 30 capsule; Refill: 1    4. Acute vaginitis  Assessment & Plan:  Treat with diflucan     Orders:  -     fluconazole (Diflucan) 150 MG tablet; Take 1 tablet by mouth 1 (One) Time for 1 dose.  Dispense: 1 tablet; Refill: 0  -     POCT urinalysis dipstick, automated    5. Acute cystitis with hematuria  Assessment & Plan:  Treat with macrobid, sent for culture     Orders:  -     nitrofurantoin, macrocrystal-monohydrate, (Macrobid) 100 MG capsule; Take 1 capsule by mouth 2 (Two) Times a Day.  Dispense: 14 capsule; Refill: 0  -     POCT urinalysis dipstick, automated    6. Dysuria  -     Urine Culture - Urine, Urine, Clean Catch; Future  -     POC Pregnancy, Urine  -     POCT urinalysis dipstick, automated    7. Encounter for surveillance of injectable contraceptive  -     medroxyPROGESTERone (DEPO-PROVERA) 150 MG/ML injection; Inject 1 mL into the appropriate muscle as directed by prescriber Every 3 (Three) Months.  Dispense: 1 mL; Refill: 2    8. Mild intermittent asthma, unspecified whether complicated  -     ProAir  (90 Base) MCG/ACT inhaler; Inhale 2 puffs Every 6 (Six) Hours As Needed for Wheezing.  Dispense: 18 g; Refill: 1  -     budesonide-formoterol (Symbicort) 160-4.5 MCG/ACT inhaler; Inhale 2 puffs 2 (Two) Times a Day.  Dispense: 10.2 g; Refill: 2    9. Need for vaccination  -     Pneumococcal Polysaccharide Vaccine 23-Valent Greater Than or Equal To 3yo Subcutaneous / IM    F/u in December for Depo shot when due    Follow Up   Return in about 5 weeks (around 12/29/2021).    Follow up if symptoms worsen or persist or has new or concerning symptoms, go to ER for severe symptoms.   Reviewed common medication effects and side effects and to report side effects immediately, the patient expressed good understanding.  Encouraged medication compliance and the  importance of keeping scheduled follow up appointments with me and any other providers.  If a referral was made please contact our office if you have not heard about an appointment in the next 2 weeks.   If labs or images are ordered we will contact you with the results within the next week.  If you have not heard from us after a week please call our office to inquire about the results.   Patient was given instructions and counseling regarding her condition or for health maintenance advice. Please see specific information pulled into the AVS if appropriate.     Riana Ramos PA-C    * Please note that portions of this note were completed with a voice recognition program.

## 2021-11-23 LAB — BACTERIA SPEC AEROBE CULT: ABNORMAL

## 2021-11-23 RX ORDER — AMOXICILLIN 875 MG/1
875 TABLET, COATED ORAL 2 TIMES DAILY
Qty: 14 TABLET | Refills: 0 | Status: SHIPPED | OUTPATIENT
Start: 2021-11-23

## 2021-12-15 RX ORDER — AMOXICILLIN 875 MG/1
TABLET, COATED ORAL
Qty: 14 TABLET | Refills: 0 | OUTPATIENT
Start: 2021-12-15

## 2021-12-21 NOTE — TELEPHONE ENCOUNTER
Please ask pt to sched f/u visit asap  
Please come in for visit asap, we can get vaginal swab and another urine sample and try a different antibiotic. 
Pt N/S at appt on 12/16/2021.       
Refill request  amoxicillin   Last refill 11/23/21  Last visit 11/22/21  Next visit 12/29/21      
SHRUTIM with office # for pt to return call at her convenience.       
Scheduled for an appointment for tomorrow afternoon  
States her sx never subsided and she is requesting more help please call her back to notify of next steps   
We do not normally refill antibiotics, are her UTI sx returning? 
No

## 2022-08-31 ENCOUNTER — TELEMEDICINE (OUTPATIENT)
Dept: FAMILY MEDICINE CLINIC | Facility: CLINIC | Age: 32
End: 2022-08-31

## 2022-08-31 DIAGNOSIS — B86 SCABIES: Primary | ICD-10-CM

## 2022-08-31 DIAGNOSIS — F31.9 BIPOLAR 1 DISORDER: ICD-10-CM

## 2022-08-31 DIAGNOSIS — F41.1 GENERALIZED ANXIETY DISORDER: ICD-10-CM

## 2022-08-31 DIAGNOSIS — F43.10 PTSD (POST-TRAUMATIC STRESS DISORDER): ICD-10-CM

## 2022-08-31 PROCEDURE — 99214 OFFICE O/P EST MOD 30 MIN: CPT | Performed by: FAMILY MEDICINE

## 2022-08-31 RX ORDER — HYDROXYZINE HYDROCHLORIDE 25 MG/1
25 TABLET, FILM COATED ORAL 3 TIMES DAILY PRN
Qty: 30 TABLET | Refills: 0 | Status: SHIPPED | OUTPATIENT
Start: 2022-08-31 | End: 2022-09-08

## 2022-08-31 RX ORDER — LAMOTRIGINE 25 MG/1
50 TABLET ORAL DAILY
Qty: 60 TABLET | Refills: 1 | Status: SHIPPED | OUTPATIENT
Start: 2022-08-31 | End: 2022-11-01

## 2022-08-31 RX ORDER — ESCITALOPRAM OXALATE 20 MG/1
20 TABLET ORAL DAILY
Qty: 30 TABLET | Refills: 1 | Status: SHIPPED | OUTPATIENT
Start: 2022-08-31 | End: 2022-11-01

## 2022-08-31 RX ORDER — PERMETHRIN 50 MG/G
1 CREAM TOPICAL ONCE
Qty: 1 EACH | Refills: 0 | Status: SHIPPED | OUTPATIENT
Start: 2022-08-31 | End: 2022-08-31

## 2022-08-31 RX ORDER — QUETIAPINE FUMARATE 200 MG/1
200 TABLET, FILM COATED ORAL 2 TIMES DAILY
Qty: 60 TABLET | Refills: 1 | Status: SHIPPED | OUTPATIENT
Start: 2022-08-31 | End: 2022-11-01

## 2022-08-31 RX ORDER — PRAZOSIN HYDROCHLORIDE 1 MG/1
1 CAPSULE ORAL NIGHTLY
Qty: 30 CAPSULE | Refills: 1 | Status: SHIPPED | OUTPATIENT
Start: 2022-08-31 | End: 2022-11-01

## 2022-08-31 NOTE — PROGRESS NOTES
Mode of Visit: Video  Location of patient: home  You have chosen to receive care through a telehealth visit.  Does the patient consent to use a video/audio connection for your medical care today? Yes  The visit included audio and video interaction. No technical issues occurred during this visit.     Chief Complaint  Rash and Med Refill    Patient seen currently for rash over body.  Exposure to scabies.  Rash is itching.  Present for over a month.  Boyfriend also with it.  Patient also with history of bipolar disorder and PTSD.  Saw Dr. Alvarado about 10 months ago.  Was told at that time she needed to see psychiatry.  Says that the office did not call her back.  Not sure why she did not try to get in contact with us over that 10 months.    Pretty Merritt presents to [facility]      Review of Systems    Objective   Vital Signs:   There were no vitals taken for this visit.    Virtual Visit Physical Exam             Assessment and Plan    Diagnoses and all orders for this visit:    1. Scabies (Primary)    2. Generalized anxiety disorder  -     Ambulatory Referral to Psychiatry  -     escitalopram (LEXAPRO) 20 MG tablet; Take 1 tablet by mouth Daily.  Dispense: 30 tablet; Refill: 1    3. Bipolar 1 disorder (HCC)  -     Ambulatory Referral to Psychiatry  -     lamoTRIgine (LaMICtal) 25 MG tablet; Take 2 tablets by mouth Daily.  Dispense: 60 tablet; Refill: 1  -     QUEtiapine (SEROquel) 200 MG tablet; Take 1 tablet by mouth 2 (Two) Times a Day.  Dispense: 60 tablet; Refill: 1    4. PTSD (post-traumatic stress disorder)  -     Ambulatory Referral to Psychiatry  -     prazosin (MINIPRESS) 1 MG capsule; Take 1 capsule by mouth Every Night.  Dispense: 30 capsule; Refill: 1    Other orders  -     permethrin (ELIMITE) 5 % cream; Apply 1 application topically to the appropriate area as directed 1 (One) Time for 1 dose.  Dispense: 1 each; Refill: 0  -     hydrOXYzine (ATARAX) 25 MG tablet; Take  1 tablet by mouth 3 (Three) Times a Day As Needed for Itching.  Dispense: 30 tablet; Refill: 0        Treat current scabies issue.  Gave permethrin and hydroxyzine.  Referral for further evaluation and management of her chronic medical conditions.  Gave refill for now so she will not be out of medication.    Follow Up   No follow-ups on file.  Patient was given instructions and counseling regarding her condition or for health maintenance advice. Please see specific information pulled into the AVS if appropriate.

## 2022-09-08 RX ORDER — HYDROXYZINE HYDROCHLORIDE 25 MG/1
TABLET, FILM COATED ORAL
Qty: 60 TABLET | Refills: 0 | Status: SHIPPED | OUTPATIENT
Start: 2022-09-08 | End: 2022-11-01

## 2022-09-09 ENCOUNTER — TELEPHONE (OUTPATIENT)
Dept: FAMILY MEDICINE CLINIC | Facility: CLINIC | Age: 32
End: 2022-09-09

## 2022-09-19 RX ORDER — PERMETHRIN 50 MG/G
CREAM TOPICAL
COMMUNITY
Start: 2022-08-31

## 2022-09-19 RX ORDER — LAMOTRIGINE 25 MG/1
25 TABLET ORAL
COMMUNITY
Start: 2022-03-24

## 2022-09-19 RX ORDER — PROMETHAZINE HYDROCHLORIDE 25 MG/1
25 TABLET ORAL EVERY 6 HOURS PRN
COMMUNITY
Start: 2022-06-25

## 2022-10-29 DIAGNOSIS — F43.10 PTSD (POST-TRAUMATIC STRESS DISORDER): ICD-10-CM

## 2022-10-29 DIAGNOSIS — F41.1 GENERALIZED ANXIETY DISORDER: ICD-10-CM

## 2022-10-29 DIAGNOSIS — F31.9 BIPOLAR 1 DISORDER: ICD-10-CM

## 2022-11-01 RX ORDER — QUETIAPINE FUMARATE 200 MG/1
TABLET, FILM COATED ORAL
Qty: 60 TABLET | Refills: 0 | Status: SHIPPED | OUTPATIENT
Start: 2022-11-01

## 2022-11-01 RX ORDER — PRAZOSIN HYDROCHLORIDE 1 MG/1
CAPSULE ORAL
Qty: 30 CAPSULE | Refills: 0 | Status: SHIPPED | OUTPATIENT
Start: 2022-11-01

## 2022-11-01 RX ORDER — ESCITALOPRAM OXALATE 20 MG/1
TABLET ORAL
Qty: 30 TABLET | Refills: 0 | Status: SHIPPED | OUTPATIENT
Start: 2022-11-01

## 2022-11-01 RX ORDER — LAMOTRIGINE 25 MG/1
TABLET ORAL
Qty: 60 TABLET | Refills: 0 | Status: SHIPPED | OUTPATIENT
Start: 2022-11-01

## 2022-11-01 RX ORDER — HYDROXYZINE HYDROCHLORIDE 25 MG/1
TABLET, FILM COATED ORAL
Qty: 90 TABLET | Refills: 0 | Status: SHIPPED | OUTPATIENT
Start: 2022-11-01

## 2022-12-05 DIAGNOSIS — F31.9 BIPOLAR 1 DISORDER: ICD-10-CM

## 2022-12-05 DIAGNOSIS — F41.1 GENERALIZED ANXIETY DISORDER: ICD-10-CM

## 2022-12-05 DIAGNOSIS — F43.10 PTSD (POST-TRAUMATIC STRESS DISORDER): ICD-10-CM

## 2022-12-06 RX ORDER — QUETIAPINE FUMARATE 200 MG/1
TABLET, FILM COATED ORAL
Qty: 60 TABLET | Refills: 0 | OUTPATIENT
Start: 2022-12-06

## 2022-12-06 RX ORDER — LAMOTRIGINE 25 MG/1
TABLET ORAL
Qty: 60 TABLET | Refills: 0 | OUTPATIENT
Start: 2022-12-06

## 2022-12-06 RX ORDER — ESCITALOPRAM OXALATE 20 MG/1
TABLET ORAL
Qty: 30 TABLET | Refills: 0 | OUTPATIENT
Start: 2022-12-06

## 2022-12-06 RX ORDER — HYDROXYZINE HYDROCHLORIDE 25 MG/1
TABLET, FILM COATED ORAL
Qty: 90 TABLET | Refills: 0 | OUTPATIENT
Start: 2022-12-06

## 2022-12-06 RX ORDER — PRAZOSIN HYDROCHLORIDE 1 MG/1
CAPSULE ORAL
Qty: 30 CAPSULE | Refills: 0 | OUTPATIENT
Start: 2022-12-06

## 2022-12-06 NOTE — TELEPHONE ENCOUNTER
I only saw her for an acute visit for scabies.  I have refilled for 1 month so she would not be out of medication and she was told she needed to follow-up with her primary care doctor or psychiatry.  I would not refill.

## 2023-10-31 ENCOUNTER — TELEPHONE (OUTPATIENT)
Dept: FAMILY MEDICINE CLINIC | Age: 33
End: 2023-10-31

## 2023-10-31 NOTE — TELEPHONE ENCOUNTER
Pt was called due to no-showing her appt on 10/31/2023. She did r/s and was informed of the no-show policy. A letter will be sent.

## 2023-11-02 ENCOUNTER — LAB (OUTPATIENT)
Dept: LAB | Facility: HOSPITAL | Age: 33
End: 2023-11-02
Payer: MEDICAID

## 2023-11-02 ENCOUNTER — OFFICE VISIT (OUTPATIENT)
Dept: FAMILY MEDICINE CLINIC | Age: 33
End: 2023-11-02
Payer: MEDICAID

## 2023-11-02 VITALS
HEIGHT: 65 IN | HEART RATE: 79 BPM | WEIGHT: 197.2 LBS | OXYGEN SATURATION: 96 % | DIASTOLIC BLOOD PRESSURE: 57 MMHG | SYSTOLIC BLOOD PRESSURE: 93 MMHG | TEMPERATURE: 98.7 F | BODY MASS INDEX: 32.86 KG/M2

## 2023-11-02 DIAGNOSIS — F31.9 BIPOLAR 1 DISORDER: ICD-10-CM

## 2023-11-02 DIAGNOSIS — E55.9 VITAMIN D DEFICIENCY: ICD-10-CM

## 2023-11-02 DIAGNOSIS — R74.8 ELEVATED LIVER ENZYMES: ICD-10-CM

## 2023-11-02 DIAGNOSIS — F10.11 HISTORY OF ALCOHOL ABUSE: ICD-10-CM

## 2023-11-02 DIAGNOSIS — F42.9 OBSESSIVE-COMPULSIVE DISORDER, UNSPECIFIED TYPE: ICD-10-CM

## 2023-11-02 DIAGNOSIS — F41.1 GENERALIZED ANXIETY DISORDER: Primary | ICD-10-CM

## 2023-11-02 DIAGNOSIS — G25.81 RESTLESS LEG SYNDROME: ICD-10-CM

## 2023-11-02 DIAGNOSIS — F43.10 PTSD (POST-TRAUMATIC STRESS DISORDER): ICD-10-CM

## 2023-11-02 LAB
25(OH)D3 SERPL-MCNC: 46.3 NG/ML (ref 30–100)
ALBUMIN SERPL-MCNC: 4.6 G/DL (ref 3.5–5.2)
ALBUMIN/GLOB SERPL: 1.6 G/DL
ALP SERPL-CCNC: 65 U/L (ref 39–117)
ALT SERPL W P-5'-P-CCNC: 26 U/L (ref 1–33)
ANION GAP SERPL CALCULATED.3IONS-SCNC: 12.9 MMOL/L (ref 5–15)
AST SERPL-CCNC: 21 U/L (ref 1–32)
BASOPHILS # BLD AUTO: 0.08 10*3/MM3 (ref 0–0.2)
BASOPHILS NFR BLD AUTO: 0.7 % (ref 0–1.5)
BILIRUB SERPL-MCNC: 0.4 MG/DL (ref 0–1.2)
BUN SERPL-MCNC: 12 MG/DL (ref 6–20)
BUN/CREAT SERPL: 15.8 (ref 7–25)
CALCIUM SPEC-SCNC: 9.6 MG/DL (ref 8.6–10.5)
CHLORIDE SERPL-SCNC: 100 MMOL/L (ref 98–107)
CO2 SERPL-SCNC: 25.1 MMOL/L (ref 22–29)
CREAT SERPL-MCNC: 0.76 MG/DL (ref 0.57–1)
DEPRECATED RDW RBC AUTO: 43.8 FL (ref 37–54)
EGFRCR SERPLBLD CKD-EPI 2021: 107.6 ML/MIN/1.73
EOSINOPHIL # BLD AUTO: 0.22 10*3/MM3 (ref 0–0.4)
EOSINOPHIL NFR BLD AUTO: 1.9 % (ref 0.3–6.2)
ERYTHROCYTE [DISTWIDTH] IN BLOOD BY AUTOMATED COUNT: 13.4 % (ref 12.3–15.4)
GLOBULIN UR ELPH-MCNC: 2.8 GM/DL
GLUCOSE SERPL-MCNC: 83 MG/DL (ref 65–99)
HCT VFR BLD AUTO: 41 % (ref 34–46.6)
HGB BLD-MCNC: 13.2 G/DL (ref 12–15.9)
IMM GRANULOCYTES # BLD AUTO: 0.16 10*3/MM3 (ref 0–0.05)
IMM GRANULOCYTES NFR BLD AUTO: 1.4 % (ref 0–0.5)
LARGE PLATELETS: NORMAL
LYMPHOCYTES # BLD AUTO: 2.38 10*3/MM3 (ref 0.7–3.1)
LYMPHOCYTES NFR BLD AUTO: 21.1 % (ref 19.6–45.3)
MCH RBC QN AUTO: 28.9 PG (ref 26.6–33)
MCHC RBC AUTO-ENTMCNC: 32.2 G/DL (ref 31.5–35.7)
MCV RBC AUTO: 89.7 FL (ref 79–97)
MONOCYTES # BLD AUTO: 0.89 10*3/MM3 (ref 0.1–0.9)
MONOCYTES NFR BLD AUTO: 7.9 % (ref 5–12)
NEUTROPHILS NFR BLD AUTO: 67 % (ref 42.7–76)
NEUTROPHILS NFR BLD AUTO: 7.57 10*3/MM3 (ref 1.7–7)
NRBC BLD AUTO-RTO: 0 /100 WBC (ref 0–0.2)
PLATELET # BLD AUTO: 72 10*3/MM3 (ref 140–450)
PMV BLD AUTO: 15.8 FL (ref 6–12)
POTASSIUM SERPL-SCNC: 4.3 MMOL/L (ref 3.5–5.2)
PROT SERPL-MCNC: 7.4 G/DL (ref 6–8.5)
RBC # BLD AUTO: 4.57 10*6/MM3 (ref 3.77–5.28)
RBC MORPH BLD: NORMAL
SMALL PLATELETS BLD QL SMEAR: NORMAL
SODIUM SERPL-SCNC: 138 MMOL/L (ref 136–145)
WBC MORPH BLD: NORMAL
WBC NRBC COR # BLD: 11.3 10*3/MM3 (ref 3.4–10.8)

## 2023-11-02 PROCEDURE — 85025 COMPLETE CBC W/AUTO DIFF WBC: CPT

## 2023-11-02 PROCEDURE — 99204 OFFICE O/P NEW MOD 45 MIN: CPT | Performed by: NURSE PRACTITIONER

## 2023-11-02 PROCEDURE — 85007 BL SMEAR W/DIFF WBC COUNT: CPT

## 2023-11-02 PROCEDURE — 82306 VITAMIN D 25 HYDROXY: CPT

## 2023-11-02 PROCEDURE — 36415 COLL VENOUS BLD VENIPUNCTURE: CPT

## 2023-11-02 PROCEDURE — 80053 COMPREHEN METABOLIC PANEL: CPT

## 2023-11-02 PROCEDURE — 1159F MED LIST DOCD IN RCRD: CPT | Performed by: NURSE PRACTITIONER

## 2023-11-02 PROCEDURE — 1160F RVW MEDS BY RX/DR IN RCRD: CPT | Performed by: NURSE PRACTITIONER

## 2023-11-02 RX ORDER — CHOLECALCIFEROL (VITAMIN D3) 1250 MCG
CAPSULE ORAL
COMMUNITY
Start: 2023-10-05

## 2023-11-02 RX ORDER — FAMOTIDINE 20 MG/1
20 TABLET, FILM COATED ORAL EVERY MORNING
COMMUNITY
Start: 2023-10-12

## 2023-11-02 RX ORDER — QUETIAPINE FUMARATE 25 MG/1
25 TABLET, FILM COATED ORAL AS NEEDED
COMMUNITY
End: 2023-11-02 | Stop reason: SDUPTHER

## 2023-11-02 RX ORDER — ROPINIROLE 1 MG/1
1 TABLET, FILM COATED ORAL NIGHTLY
Qty: 90 TABLET | Refills: 0 | Status: SHIPPED | OUTPATIENT
Start: 2023-11-02

## 2023-11-02 RX ORDER — IBUPROFEN 800 MG/1
800 TABLET ORAL EVERY 6 HOURS PRN
COMMUNITY
Start: 2023-08-10

## 2023-11-02 RX ORDER — MIRTAZAPINE 15 MG/1
15 TABLET, FILM COATED ORAL NIGHTLY
COMMUNITY
Start: 2023-10-16 | End: 2023-11-02 | Stop reason: SDUPTHER

## 2023-11-02 RX ORDER — PROPRANOLOL HYDROCHLORIDE 20 MG/1
20 TABLET ORAL 3 TIMES DAILY
Qty: 270 TABLET | Refills: 0 | Status: SHIPPED | OUTPATIENT
Start: 2023-11-02

## 2023-11-02 RX ORDER — MULTIPLE VITAMINS W/ MINERALS TAB 9MG-400MCG
1 TAB ORAL DAILY
COMMUNITY
Start: 2023-10-09

## 2023-11-02 RX ORDER — CHOLECALCIFEROL (VITAMIN D3) 125 MCG
5 CAPSULE ORAL NIGHTLY
COMMUNITY
Start: 2023-10-05

## 2023-11-02 RX ORDER — SOD CHLORD/LANOLIN/MIN.OIL/PET
LOTION (ML) TOPICAL DAILY
COMMUNITY
Start: 2023-07-26

## 2023-11-02 RX ORDER — ROPINIROLE 1 MG/1
1 TABLET, FILM COATED ORAL NIGHTLY
COMMUNITY
Start: 2023-09-26 | End: 2023-11-02 | Stop reason: SDUPTHER

## 2023-11-02 RX ORDER — ESCITALOPRAM OXALATE 20 MG/1
20 TABLET ORAL DAILY
Qty: 90 TABLET | Refills: 0 | Status: SHIPPED | OUTPATIENT
Start: 2023-11-02

## 2023-11-02 RX ORDER — PRAZOSIN HYDROCHLORIDE 2 MG/1
2 CAPSULE ORAL NIGHTLY
COMMUNITY
End: 2023-11-02 | Stop reason: SDUPTHER

## 2023-11-02 RX ORDER — QUETIAPINE FUMARATE 25 MG/1
25 TABLET, FILM COATED ORAL EVERY 12 HOURS PRN
Qty: 180 TABLET | Refills: 0 | Status: SHIPPED | OUTPATIENT
Start: 2023-11-02

## 2023-11-02 RX ORDER — QUETIAPINE FUMARATE 300 MG/1
300 TABLET, FILM COATED ORAL NIGHTLY
Qty: 90 TABLET | Refills: 0 | Status: SHIPPED | OUTPATIENT
Start: 2023-11-02

## 2023-11-02 RX ORDER — PRAZOSIN HYDROCHLORIDE 2 MG/1
2 CAPSULE ORAL NIGHTLY
Qty: 90 CAPSULE | Refills: 0 | Status: SHIPPED | OUTPATIENT
Start: 2023-11-02

## 2023-11-02 RX ORDER — ONDANSETRON 4 MG/1
4 TABLET, ORALLY DISINTEGRATING ORAL EVERY 8 HOURS PRN
COMMUNITY
Start: 2023-07-10

## 2023-11-02 RX ORDER — MIRTAZAPINE 15 MG/1
15 TABLET, FILM COATED ORAL NIGHTLY
Qty: 90 TABLET | Refills: 0 | Status: SHIPPED | OUTPATIENT
Start: 2023-11-02

## 2023-11-02 RX ORDER — PROPRANOLOL HYDROCHLORIDE 20 MG/1
20 TABLET ORAL 3 TIMES DAILY
COMMUNITY
Start: 2023-10-12 | End: 2023-11-02 | Stop reason: SDUPTHER

## 2023-11-02 RX ORDER — QUETIAPINE FUMARATE 300 MG/1
300 TABLET, FILM COATED ORAL NIGHTLY
COMMUNITY
End: 2023-11-02 | Stop reason: SDUPTHER

## 2023-11-02 NOTE — PROGRESS NOTES
Chief Complaint  Pretty Merritt presents to [facility] for Establish Care (GI specialists to get liver checked from past drinking )    Subjective          History of Present Illness    Pretty is here today as a new patient. She reports that she was recently in rehab for 4 months at [facility] for alcohol abuse. She is attending AA. She is not currently attending counseling or seeing psychiatry but is interested. She is requesting refills on her medicatio.  She had lab work while in rehab that showed elevated liver enzymes. Was advised to follow up with GI specialist. She reports that she was released on . Had repeat labs just prior to release that showed improvement. Denies abdominal symptoms.   Also reports Vitamin D was low and taking Vitamin supplement. Hepatitis testing was negative.       Review of Systems      Allergies   Allergen Reactions    Clonazepam Other (See Comments), Anaphylaxis and Unknown (See Comments)     Tingling tongue.   Klonopin  Tingling tongue.     Trazodone Provider Review Needed      Past Medical History:   Diagnosis Date    Anxiety     Apnea     Asthma     Breathing problem      delivery delivered      delivery delivered     Common migraine     Depression     Esophageal reflux     Headache     Kidney infection     Menstrual cycle problem     Neurotic depression     Sleep apnea      Current Outpatient Medications   Medication Sig Dispense Refill    budesonide-formoterol (Symbicort) 160-4.5 MCG/ACT inhaler Inhale 2 puffs 2 (Two) Times a Day. 10.2 g 2    Cholecalciferol (Vitamin D3) 1.25 MG (34455 UT) capsule Take  by mouth Every 7 (Seven) Days.      Emollient (Lubriderm lotion) lotion Apply  topically to the appropriate area as directed Daily.      escitalopram (LEXAPRO) 20 MG tablet Take 1 tablet by mouth Daily. 90 tablet 0    famotidine (PEPCID) 20 MG tablet Take 1 tablet by mouth Every Morning.       ibuprofen (ADVIL,MOTRIN) 800 MG tablet Take 1 tablet by mouth Every 6 (Six) Hours As Needed.      medroxyPROGESTERone (DEPO-PROVERA) 150 MG/ML injection Inject 1 mL into the appropriate muscle as directed by prescriber Every 3 (Three) Months. 1 mL 2    melatonin 5 MG tablet tablet Take 1 tablet by mouth Every Night.      mirtazapine (REMERON) 15 MG tablet Take 1 tablet by mouth Every Night. 90 tablet 0    multivitamin with minerals tablet tablet Take 1 tablet by mouth Daily.      Narcan 4 MG/0.1ML nasal spray 1 spray into the nostril(s) as directed by provider As Needed.      ondansetron ODT (ZOFRAN-ODT) 4 MG disintegrating tablet Place 1 tablet on the tongue Every 8 (Eight) Hours As Needed.      prazosin (MINIPRESS) 2 MG capsule Take 1 capsule by mouth Every Night. 90 capsule 0    ProAir  (90 Base) MCG/ACT inhaler Inhale 2 puffs Every 6 (Six) Hours As Needed for Wheezing. 18 g 1    promethazine (PHENERGAN) 25 MG tablet Take 1 tablet by mouth Every 6 (Six) Hours As Needed.      propranolol (INDERAL) 20 MG tablet Take 1 tablet by mouth 3 (Three) Times a Day. 270 tablet 0    QUEtiapine (SEROquel) 25 MG tablet Take 1 tablet by mouth Every 12 (Twelve) Hours As Needed (anxiety). 180 tablet 0    QUEtiapine (SEROquel) 300 MG tablet Take 1 tablet by mouth Every Night. 90 tablet 0    rOPINIRole (REQUIP) 1 MG tablet Take 1 tablet by mouth Every Night. 90 tablet 0    vitamin B-6 (PYRIDOXINE) 25 MG tablet Take 1 tablet by mouth Daily.       No current facility-administered medications for this visit.     Past Surgical History:   Procedure Laterality Date     SECTION      x 2. 2012, 2013    TUBAL ABDOMINAL LIGATION      WISDOM TOOTH EXTRACTION  2012      Social History     Tobacco Use    Smoking status: Former     Packs/day: 1.5     Types: Cigarettes     Quit date: 10/16/2021     Years since quittin.0     Passive exposure: Past    Smokeless tobacco: Never   Vaping Use    Vaping Use: Every day  "   Substances: Nicotine, Flavoring    Devices: Disposable   Substance Use Topics    Alcohol use: Not Currently     Comment: former drinker 8/16/2021    Drug use: Yes     Types: Marijuana     Comment: opiods stopped using 8/16/2021 - pain meds / marijuana      Family History   Problem Relation Age of Onset    Bipolar disorder Mother     Panic disorder Mother     Mental illness Mother     Other Mother         migraine headaches    Asthma Mother     Migraines Mother     Allergies Mother     Depression Mother     Learning disabilities Mother     Developmental delay Mother     Arthritis Father     Other Father         migraine headaches    Mental illness Father     Allergies Father     Asthma Father     Learning disabilities Brother     Depression Brother     Allergies Brother     Migraines Brother     Developmental delay Brother     Mental illness Brother     Colon cancer Paternal Grandfather      Health Maintenance Due   Topic Date Due    BMI FOLLOWUP  Never done    ANNUAL PHYSICAL  Never done      Immunization History   Administered Date(s) Administered    COVID-19 (PFIZER) Purple Cap Monovalent 10/28/2021, 11/21/2021    Flu Vaccine Quad PF >36MO 11/07/2019    Fluzone (or Fluarix & Flulaval for VFC) >6mos 11/21/2021    Influenza, Unspecified 10/08/2020    Pneumococcal Polysaccharide (PPSV23) 11/22/2021    Tdap 06/13/2017        Objective     Vitals:    11/02/23 1248   BP: 93/57   BP Location: Left arm   Patient Position: Sitting   Cuff Size: Large Adult   Pulse: 79   Temp: 98.7 °F (37.1 °C)   TempSrc: Oral   SpO2: 96%   Weight: 89.4 kg (197 lb 3.2 oz)   Height: 165.1 cm (65\")     Body mass index is 32.82 kg/m².         Physical Exam  Vitals reviewed.   Constitutional:       General: She is not in acute distress.     Appearance: Normal appearance. She is well-developed.   HENT:      Head: Normocephalic and atraumatic.   Cardiovascular:      Rate and Rhythm: Normal rate and regular rhythm.   Pulmonary:      Effort: " Pulmonary effort is normal.      Breath sounds: Normal breath sounds.   Abdominal:      General: Bowel sounds are normal.      Palpations: Abdomen is soft.   Neurological:      Mental Status: She is alert and oriented to person, place, and time.   Psychiatric:         Mood and Affect: Mood and affect normal.           Result Review :                               Assessment and Plan      Diagnoses and all orders for this visit:    1. Generalized anxiety disorder (Primary)  -     Ambulatory Referral to Psychiatry  -     escitalopram (LEXAPRO) 20 MG tablet; Take 1 tablet by mouth Daily.  Dispense: 90 tablet; Refill: 0  -     mirtazapine (REMERON) 15 MG tablet; Take 1 tablet by mouth Every Night.  Dispense: 90 tablet; Refill: 0  -     propranolol (INDERAL) 20 MG tablet; Take 1 tablet by mouth 3 (Three) Times a Day.  Dispense: 270 tablet; Refill: 0  -     QUEtiapine (SEROquel) 300 MG tablet; Take 1 tablet by mouth Every Night.  Dispense: 90 tablet; Refill: 0  -     QUEtiapine (SEROquel) 25 MG tablet; Take 1 tablet by mouth Every 12 (Twelve) Hours As Needed (anxiety).  Dispense: 180 tablet; Refill: 0    2. History of alcohol abuse  -     Ambulatory Referral to Psychiatry    3. Bipolar 1 disorder  -     Ambulatory Referral to Psychiatry    4. PTSD (post-traumatic stress disorder)  -     Ambulatory Referral to Psychiatry  -     prazosin (MINIPRESS) 2 MG capsule; Take 1 capsule by mouth Every Night.  Dispense: 90 capsule; Refill: 0  -     QUEtiapine (SEROquel) 300 MG tablet; Take 1 tablet by mouth Every Night.  Dispense: 90 tablet; Refill: 0    5. Obsessive-compulsive disorder, unspecified type  -     Ambulatory Referral to Psychiatry    6. Elevated liver enzymes  -     Comprehensive metabolic panel; Future  -     CBC w AUTO Differential; Future    7. Vitamin D deficiency  -     Vitamin D 25 hydroxy; Future    8. Restless leg syndrome  -     rOPINIRole (REQUIP) 1 MG tablet; Take 1 tablet by mouth Every Night.  Dispense: 90  tablet; Refill: 0      Refilling medication today for near term. Discussed that we will refer her to psychiatry for continued management of mental health diagnosis. She will continue AA meetings. Additionally, will recheck lab work today.          Follow Up     Return in about 3 months (around 2/2/2024) for Annual physical.

## 2023-11-06 DIAGNOSIS — D69.6 THROMBOCYTOPENIA: ICD-10-CM

## 2023-11-06 DIAGNOSIS — F10.11 HISTORY OF ALCOHOL ABUSE: Primary | ICD-10-CM

## 2023-11-06 DIAGNOSIS — R74.8 ELEVATED LIVER ENZYMES: ICD-10-CM

## 2023-12-20 ENCOUNTER — TELEPHONE (OUTPATIENT)
Dept: FAMILY MEDICINE CLINIC | Age: 33
End: 2023-12-20
Payer: MEDICAID

## 2024-01-04 DIAGNOSIS — F41.1 GENERALIZED ANXIETY DISORDER: ICD-10-CM

## 2024-01-04 DIAGNOSIS — F43.10 PTSD (POST-TRAUMATIC STRESS DISORDER): ICD-10-CM

## 2024-01-04 RX ORDER — QUETIAPINE FUMARATE 300 MG/1
300 TABLET, FILM COATED ORAL NIGHTLY
Qty: 30 TABLET | Refills: 0 | Status: SHIPPED | OUTPATIENT
Start: 2024-01-04

## 2024-01-04 RX ORDER — IBUPROFEN 800 MG/1
800 TABLET ORAL EVERY 6 HOURS PRN
Qty: 90 TABLET | Refills: 2 | Status: SHIPPED | OUTPATIENT
Start: 2024-01-04

## 2024-01-04 NOTE — TELEPHONE ENCOUNTER
Caller: Pretty Merritt    Relationship: Self    Best call back number:  [phone]    Requested Prescriptions:   Requested Prescriptions     Pending Prescriptions Disp Refills    QUEtiapine (SEROquel) 300 MG tablet 90 tablet 0     Sig: Take 1 tablet by mouth Every Night.    ibuprofen (ADVIL,MOTRIN) 800 MG tablet       Sig: Take 1 tablet by mouth Every 6 (Six) Hours As Needed.        Pharmacy where request should be sent: [place] [address] [address] [facility] [phone] [facility] [phone]      Last office visit with prescribing clinician: 11/2/2023   Last telemedicine visit with prescribing clinician: Visit date not found   Next office visit with prescribing clinician: 2/5/2024     Additional details provided by patient:     Does the patient have less than a 3 day supply:  [x] Yes  [] No    Would you like a call back once the refill request has been completed: [] Yes [] No    If the office needs to give you a call back, can they leave a voicemail: [] Yes [] No    Afshan Quick Rep   01/04/24 15:23 EST

## 2024-01-06 DIAGNOSIS — J45.20 MILD INTERMITTENT ASTHMA, UNSPECIFIED WHETHER COMPLICATED: ICD-10-CM

## 2024-01-06 DIAGNOSIS — Z30.42 ENCOUNTER FOR SURVEILLANCE OF INJECTABLE CONTRACEPTIVE: ICD-10-CM

## 2024-01-06 DIAGNOSIS — G25.81 RESTLESS LEG SYNDROME: ICD-10-CM

## 2024-01-06 DIAGNOSIS — F43.10 PTSD (POST-TRAUMATIC STRESS DISORDER): ICD-10-CM

## 2024-01-06 DIAGNOSIS — F41.1 GENERALIZED ANXIETY DISORDER: ICD-10-CM

## 2024-01-08 RX ORDER — PROPRANOLOL HYDROCHLORIDE 20 MG/1
20 TABLET ORAL 3 TIMES DAILY
Qty: 270 TABLET | Refills: 0 | Status: SHIPPED | OUTPATIENT
Start: 2024-01-08

## 2024-01-08 RX ORDER — MULTIPLE VITAMINS W/ MINERALS TAB 9MG-400MCG
1 TAB ORAL DAILY
Qty: 90 TABLET | Refills: 0 | Status: SHIPPED | OUTPATIENT
Start: 2024-01-08

## 2024-01-08 RX ORDER — MEDROXYPROGESTERONE ACETATE 150 MG/ML
150 INJECTION, SUSPENSION INTRAMUSCULAR
Qty: 1 ML | Refills: 2 | OUTPATIENT
Start: 2024-01-08

## 2024-01-08 RX ORDER — MIRTAZAPINE 15 MG/1
15 TABLET, FILM COATED ORAL NIGHTLY
Qty: 90 TABLET | Refills: 0 | Status: SHIPPED | OUTPATIENT
Start: 2024-01-08

## 2024-01-08 RX ORDER — DIPHENHYDRAMINE HYDROCHLORIDE 25 MG/1
25 CAPSULE ORAL DAILY
Qty: 90 TABLET | Refills: 0 | Status: SHIPPED | OUTPATIENT
Start: 2024-01-08

## 2024-01-08 RX ORDER — QUETIAPINE FUMARATE 25 MG/1
25 TABLET, FILM COATED ORAL EVERY 12 HOURS PRN
Qty: 180 TABLET | Refills: 0 | Status: SHIPPED | OUTPATIENT
Start: 2024-01-08

## 2024-01-08 RX ORDER — ESCITALOPRAM OXALATE 20 MG/1
20 TABLET ORAL DAILY
Qty: 90 TABLET | Refills: 0 | Status: SHIPPED | OUTPATIENT
Start: 2024-01-08

## 2024-01-08 RX ORDER — PRAZOSIN HYDROCHLORIDE 2 MG/1
2 CAPSULE ORAL NIGHTLY
Qty: 90 CAPSULE | Refills: 0 | Status: SHIPPED | OUTPATIENT
Start: 2024-01-08

## 2024-01-08 RX ORDER — ROPINIROLE 1 MG/1
1 TABLET, FILM COATED ORAL NIGHTLY
Qty: 90 TABLET | Refills: 0 | Status: SHIPPED | OUTPATIENT
Start: 2024-01-08

## 2024-01-08 RX ORDER — CHOLECALCIFEROL (VITAMIN D3) 125 MCG
5 CAPSULE ORAL NIGHTLY
Qty: 90 TABLET | Refills: 0 | Status: SHIPPED | OUTPATIENT
Start: 2024-01-08

## 2024-01-08 RX ORDER — CHOLECALCIFEROL (VITAMIN D3) 1250 MCG
50000 CAPSULE ORAL
Qty: 12 CAPSULE | Refills: 0 | Status: SHIPPED | OUTPATIENT
Start: 2024-01-08

## 2024-01-08 RX ORDER — FAMOTIDINE 20 MG/1
20 TABLET, FILM COATED ORAL EVERY MORNING
Qty: 90 TABLET | Refills: 0 | Status: SHIPPED | OUTPATIENT
Start: 2024-01-08

## 2024-01-09 ENCOUNTER — PRIOR AUTHORIZATION (OUTPATIENT)
Dept: FAMILY MEDICINE CLINIC | Age: 34
End: 2024-01-09
Payer: MEDICAID

## 2024-01-16 ENCOUNTER — HOSPITAL ENCOUNTER (OUTPATIENT)
Dept: ULTRASOUND IMAGING | Facility: HOSPITAL | Age: 34
Discharge: HOME | End: 2024-01-16
Admitting: NURSE PRACTITIONER
Payer: MEDICAID

## 2024-01-16 DIAGNOSIS — F41.1 GENERALIZED ANXIETY DISORDER: ICD-10-CM

## 2024-01-16 DIAGNOSIS — Z30.42 ENCOUNTER FOR SURVEILLANCE OF INJECTABLE CONTRACEPTIVE: ICD-10-CM

## 2024-01-16 DIAGNOSIS — R74.8 ELEVATED LIVER ENZYMES: ICD-10-CM

## 2024-01-16 DIAGNOSIS — F10.11 HISTORY OF ALCOHOL ABUSE: ICD-10-CM

## 2024-01-16 DIAGNOSIS — G25.81 RESTLESS LEG SYNDROME: ICD-10-CM

## 2024-01-16 DIAGNOSIS — D69.6 THROMBOCYTOPENIA: ICD-10-CM

## 2024-01-16 PROCEDURE — 76705 ECHO EXAM OF ABDOMEN: CPT

## 2024-01-16 RX ORDER — ROPINIROLE 1 MG/1
1 TABLET, FILM COATED ORAL NIGHTLY
Qty: 90 TABLET | Refills: 0 | Status: CANCELLED | OUTPATIENT
Start: 2024-01-16

## 2024-01-16 RX ORDER — MEDROXYPROGESTERONE ACETATE 150 MG/ML
150 INJECTION, SUSPENSION INTRAMUSCULAR
Qty: 1 ML | Refills: 2 | OUTPATIENT
Start: 2024-01-16

## 2024-01-16 RX ORDER — QUETIAPINE FUMARATE 25 MG/1
25 TABLET, FILM COATED ORAL EVERY 12 HOURS PRN
Qty: 180 TABLET | Refills: 0 | Status: CANCELLED | OUTPATIENT
Start: 2024-01-16

## 2024-01-17 DIAGNOSIS — R93.2 ABNORMAL ULTRASOUND OF GALLBLADDER: Primary | ICD-10-CM

## 2024-04-22 ENCOUNTER — TELEPHONE (OUTPATIENT)
Age: 34
End: 2024-04-22

## 2024-04-29 ENCOUNTER — TELEPHONE (OUTPATIENT)
Dept: FAMILY MEDICINE CLINIC | Age: 34
End: 2024-04-29

## 2024-04-29 DIAGNOSIS — F41.1 GENERALIZED ANXIETY DISORDER: ICD-10-CM

## 2024-04-29 DIAGNOSIS — F43.10 PTSD (POST-TRAUMATIC STRESS DISORDER): ICD-10-CM

## 2024-04-29 DIAGNOSIS — J45.20 MILD INTERMITTENT ASTHMA, UNSPECIFIED WHETHER COMPLICATED: ICD-10-CM

## 2024-04-29 RX ORDER — PROPRANOLOL HYDROCHLORIDE 20 MG/1
20 TABLET ORAL 3 TIMES DAILY
Qty: 90 TABLET | Refills: 0 | Status: SHIPPED | OUTPATIENT
Start: 2024-04-29

## 2024-04-29 RX ORDER — IBUPROFEN 800 MG/1
800 TABLET ORAL EVERY 6 HOURS PRN
Qty: 30 TABLET | Refills: 0 | Status: SHIPPED | OUTPATIENT
Start: 2024-04-29

## 2024-04-29 RX ORDER — SOD CHLORD/LANOLIN/MIN.OIL/PET
LOTION (ML) TOPICAL DAILY
Qty: 117 ML | Refills: 0 | Status: SHIPPED | OUTPATIENT
Start: 2024-04-29

## 2024-04-29 RX ORDER — QUETIAPINE FUMARATE 300 MG/1
300 TABLET, FILM COATED ORAL NIGHTLY
Qty: 30 TABLET | Refills: 0 | Status: SHIPPED | OUTPATIENT
Start: 2024-04-29

## 2024-04-29 RX ORDER — FAMOTIDINE 20 MG/1
20 TABLET, FILM COATED ORAL EVERY MORNING
Qty: 30 TABLET | Refills: 0 | Status: SHIPPED | OUTPATIENT
Start: 2024-04-29

## 2024-04-29 RX ORDER — LAMOTRIGINE 25 MG/1
25 TABLET ORAL DAILY
Qty: 30 TABLET | Refills: 0 | Status: SHIPPED | OUTPATIENT
Start: 2024-04-29

## 2024-04-29 RX ORDER — ESCITALOPRAM OXALATE 20 MG/1
20 TABLET ORAL DAILY
Qty: 30 TABLET | Refills: 0 | Status: SHIPPED | OUTPATIENT
Start: 2024-04-29

## 2024-04-29 RX ORDER — MULTIPLE VITAMINS W/ MINERALS TAB 9MG-400MCG
1 TAB ORAL DAILY
Qty: 30 TABLET | Refills: 0 | Status: SHIPPED | OUTPATIENT
Start: 2024-04-29

## 2024-04-29 RX ORDER — CHOLECALCIFEROL (VITAMIN D3) 1250 MCG
50000 CAPSULE ORAL
Qty: 4 CAPSULE | Refills: 0 | Status: SHIPPED | OUTPATIENT
Start: 2024-04-29

## 2024-04-29 RX ORDER — CHOLECALCIFEROL (VITAMIN D3) 125 MCG
5 CAPSULE ORAL NIGHTLY
Qty: 30 TABLET | Refills: 0 | Status: SHIPPED | OUTPATIENT
Start: 2024-04-29

## 2024-04-29 RX ORDER — DIPHENHYDRAMINE HYDROCHLORIDE 25 MG/1
25 CAPSULE ORAL DAILY
Qty: 30 TABLET | Refills: 0 | Status: SHIPPED | OUTPATIENT
Start: 2024-04-29

## 2024-04-29 RX ORDER — HYDROXYZINE HYDROCHLORIDE 25 MG/1
25 TABLET, FILM COATED ORAL EVERY 8 HOURS PRN
Qty: 30 TABLET | Refills: 0 | Status: SHIPPED | OUTPATIENT
Start: 2024-04-29

## 2024-04-29 NOTE — TELEPHONE ENCOUNTER
We have received your request for refills. At this time, we will be able to provide you with a 30 day supply courtesy refill.  However, you are due for your next routine appointment, so you will need to be seen by your provider before additional refills can be sent.  We care about providing you with the best quality of healthcare, which requires us to periodically evaluate you in clinic.  Please call the office at [phone] to schedule an appointment with your primary care provider.

## 2024-05-23 DIAGNOSIS — J45.20 MILD INTERMITTENT ASTHMA, UNSPECIFIED WHETHER COMPLICATED: ICD-10-CM

## 2024-05-23 RX ORDER — IBUPROFEN 800 MG/1
800 TABLET ORAL EVERY 6 HOURS PRN
Qty: 30 TABLET | Refills: 0 | OUTPATIENT
Start: 2024-05-23

## 2024-05-23 RX ORDER — UREA 10 %
5 LOTION (ML) TOPICAL NIGHTLY
Qty: 30 TABLET | Refills: 0 | OUTPATIENT
Start: 2024-05-23

## 2024-05-23 RX ORDER — CHOLECALCIFEROL (VITAMIN D3) 1250 MCG
50000 CAPSULE ORAL
Qty: 4 CAPSULE | Refills: 0 | OUTPATIENT
Start: 2024-05-23

## 2024-05-23 RX ORDER — BUDESONIDE AND FORMOTEROL FUMARATE DIHYDRATE 160; 4.5 UG/1; UG/1
2 AEROSOL RESPIRATORY (INHALATION)
Qty: 10.2 G | Refills: 2 | Status: SHIPPED | OUTPATIENT
Start: 2024-05-23

## 2024-05-23 RX ORDER — DIPHENHYDRAMINE HYDROCHLORIDE 25 MG/1
25 CAPSULE ORAL DAILY
Qty: 30 TABLET | Refills: 0 | OUTPATIENT
Start: 2024-05-23

## 2024-05-23 RX ORDER — FAMOTIDINE 20 MG/1
20 TABLET, FILM COATED ORAL EVERY MORNING
Qty: 30 TABLET | Refills: 0 | OUTPATIENT
Start: 2024-05-23

## 2024-05-23 RX ORDER — MULTIPLE VITAMINS W/ MINERALS TAB 9MG-400MCG
1 TAB ORAL DAILY
Qty: 30 TABLET | Refills: 0 | OUTPATIENT
Start: 2024-05-23

## 2024-10-24 ENCOUNTER — OFFICE VISIT (OUTPATIENT)
Dept: FAMILY MEDICINE CLINIC | Age: 34
End: 2024-10-24
Payer: COMMERCIAL

## 2024-10-24 VITALS
DIASTOLIC BLOOD PRESSURE: 53 MMHG | HEIGHT: 65 IN | HEART RATE: 82 BPM | BODY MASS INDEX: 29.79 KG/M2 | WEIGHT: 178.8 LBS | OXYGEN SATURATION: 98 % | TEMPERATURE: 99 F | SYSTOLIC BLOOD PRESSURE: 109 MMHG

## 2024-10-24 DIAGNOSIS — G25.81 RESTLESS LEG SYNDROME: ICD-10-CM

## 2024-10-24 DIAGNOSIS — F43.10 PTSD (POST-TRAUMATIC STRESS DISORDER): ICD-10-CM

## 2024-10-24 DIAGNOSIS — J40 BRONCHITIS: Primary | ICD-10-CM

## 2024-10-24 DIAGNOSIS — Z30.42 ENCOUNTER FOR SURVEILLANCE OF INJECTABLE CONTRACEPTIVE: ICD-10-CM

## 2024-10-24 DIAGNOSIS — Z72.0 TOBACCO ABUSE: ICD-10-CM

## 2024-10-24 DIAGNOSIS — F41.1 GENERALIZED ANXIETY DISORDER: ICD-10-CM

## 2024-10-24 PROCEDURE — 1160F RVW MEDS BY RX/DR IN RCRD: CPT | Performed by: NURSE PRACTITIONER

## 2024-10-24 PROCEDURE — 1159F MED LIST DOCD IN RCRD: CPT | Performed by: NURSE PRACTITIONER

## 2024-10-24 PROCEDURE — 99214 OFFICE O/P EST MOD 30 MIN: CPT | Performed by: NURSE PRACTITIONER

## 2024-10-24 PROCEDURE — 1126F AMNT PAIN NOTED NONE PRSNT: CPT | Performed by: NURSE PRACTITIONER

## 2024-10-24 RX ORDER — HYDROXYZINE HYDROCHLORIDE 25 MG/1
25 TABLET, FILM COATED ORAL EVERY 8 HOURS PRN
Qty: 90 TABLET | Refills: 0 | Status: SHIPPED | OUTPATIENT
Start: 2024-10-24

## 2024-10-24 RX ORDER — ESCITALOPRAM OXALATE 20 MG/1
20 TABLET ORAL DAILY
Qty: 90 TABLET | Refills: 0 | Status: SHIPPED | OUTPATIENT
Start: 2024-10-24

## 2024-10-24 RX ORDER — AZITHROMYCIN 250 MG/1
TABLET, FILM COATED ORAL
Qty: 6 TABLET | Refills: 0 | Status: SHIPPED | OUTPATIENT
Start: 2024-10-24

## 2024-10-24 RX ORDER — LAMOTRIGINE 100 MG/1
100 TABLET ORAL DAILY
Qty: 90 TABLET | Refills: 0 | Status: SHIPPED | OUTPATIENT
Start: 2024-10-24

## 2024-10-24 RX ORDER — MEDROXYPROGESTERONE ACETATE 150 MG/ML
150 INJECTION, SUSPENSION INTRAMUSCULAR
Qty: 1 ML | Refills: 0 | Status: SHIPPED | OUTPATIENT
Start: 2024-10-24

## 2024-10-24 RX ORDER — QUETIAPINE FUMARATE 25 MG/1
25 TABLET, FILM COATED ORAL EVERY 12 HOURS PRN
Qty: 180 TABLET | Refills: 0 | Status: SHIPPED | OUTPATIENT
Start: 2024-10-24

## 2024-10-24 RX ORDER — LANOLIN ALCOHOL/MO/W.PET/CERES
100 CREAM (GRAM) TOPICAL DAILY
COMMUNITY
Start: 2024-10-14

## 2024-10-24 RX ORDER — QUETIAPINE FUMARATE 300 MG/1
300 TABLET, FILM COATED ORAL NIGHTLY
Qty: 90 TABLET | Refills: 0 | Status: SHIPPED | OUTPATIENT
Start: 2024-10-24

## 2024-10-24 RX ORDER — MIRTAZAPINE 15 MG/1
15 TABLET, FILM COATED ORAL NIGHTLY
Qty: 90 TABLET | Refills: 0 | Status: SHIPPED | OUTPATIENT
Start: 2024-10-24

## 2024-10-24 RX ORDER — DIPHENHYDRAMINE HYDROCHLORIDE 25 MG/1
25 CAPSULE ORAL DAILY
Qty: 90 TABLET | Refills: 0 | Status: SHIPPED | OUTPATIENT
Start: 2024-10-24

## 2024-10-24 RX ORDER — LANOLIN ALCOHOL/MO/W.PET/CERES
1000 CREAM (GRAM) TOPICAL DAILY
COMMUNITY
Start: 2024-10-14

## 2024-10-24 RX ORDER — FOLIC ACID 1 MG/1
1 TABLET ORAL DAILY
COMMUNITY
Start: 2024-10-01

## 2024-10-24 RX ORDER — ROPINIROLE 1 MG/1
1 TABLET, FILM COATED ORAL NIGHTLY
Qty: 90 TABLET | Refills: 0 | Status: SHIPPED | OUTPATIENT
Start: 2024-10-24

## 2024-10-24 RX ORDER — BUSPIRONE HYDROCHLORIDE 7.5 MG/1
7.5 TABLET ORAL 3 TIMES DAILY
COMMUNITY
Start: 2024-10-02

## 2024-10-24 RX ORDER — FAMOTIDINE 20 MG/1
20 TABLET, FILM COATED ORAL EVERY MORNING
Qty: 90 TABLET | Refills: 0 | Status: SHIPPED | OUTPATIENT
Start: 2024-10-24

## 2024-10-24 RX ORDER — MAGNESIUM OXIDE 400 MG/1
400 TABLET ORAL DAILY
COMMUNITY
Start: 2024-08-02

## 2024-10-24 RX ORDER — NALTREXONE 380 MG
380 KIT INTRAMUSCULAR
COMMUNITY
Start: 2024-10-22

## 2024-10-24 RX ORDER — PRAZOSIN HYDROCHLORIDE 2 MG/1
4 CAPSULE ORAL NIGHTLY
Qty: 180 CAPSULE | Refills: 0 | Status: SHIPPED | OUTPATIENT
Start: 2024-10-24

## 2024-10-24 RX ORDER — PROPRANOLOL HCL 20 MG
20 TABLET ORAL 3 TIMES DAILY
Qty: 270 TABLET | Refills: 0 | Status: SHIPPED | OUTPATIENT
Start: 2024-10-24

## 2024-10-24 RX ORDER — BENZONATATE 100 MG/1
1 CAPSULE, LIQUID FILLED ORAL DAILY
COMMUNITY
Start: 2024-10-14

## 2024-10-24 RX ORDER — DEXTROMETHORPHAN HYDROBROMIDE AND PROMETHAZINE HYDROCHLORIDE 15; 6.25 MG/5ML; MG/5ML
5 SYRUP ORAL 4 TIMES DAILY PRN
Qty: 240 ML | Refills: 0 | Status: SHIPPED | OUTPATIENT
Start: 2024-10-24

## 2024-10-24 NOTE — ASSESSMENT & PLAN NOTE
Reports stable on current treatment. Wishes to continue current treatment regimen. Discussed establishing with psychiatrist outpatient. Declines at this time. Will let me know if she changes her mind.

## 2024-10-24 NOTE — PROGRESS NOTES
Chief Complaint  Pretty Merritt presents to [facility] for Anxiety (Med refills) and Cough (Wanting an antibiotic for an ongoing cough )    Subjective          History of Present Illness    Pretty was last seen in our office on 23. She reports that she has been at sober living program in [place]. Completed program about 3 weeks ago. Hx alcohol and opoid abuse. She was seen by psychiatrist at this program. Continues to see trauma therapist. Declines need to see psychiatrist outpatient. Feels stable on current medication regimen.   She last had Depo shot 24.   She was seen at  3-4 weeks ago. Has taken numerous covid tests that were negative. She was tested for flu and strep at  as well that was negative. She has not been taking any OTC medications for symptoms. Tessalon perles at  not helpful. C/o cough. Sometimes productive.     Review of Systems      Allergies   Allergen Reactions    Clonazepam Other (See Comments), Anaphylaxis and Unknown (See Comments)     Tingling tongue.   Klonopin  Tingling tongue.     Trazodone Provider Review Needed      Past Medical History:   Diagnosis Date    Anxiety     Asthma      delivery delivered     Common migraine     Depression     Esophageal reflux     Neurotic depression      Current Outpatient Medications   Medication Sig Dispense Refill    budesonide-formoterol (Symbicort) 160-4.5 MCG/ACT inhaler Inhale 2 puffs 2 (Two) Times a Day. 10.2 g 2    busPIRone (BUSPAR) 7.5 MG tablet Take 1 tablet by mouth 3 (Three) Times a Day.      Cholecalciferol (Vitamin D3) 1.25 MG (19486 UT) capsule Take 1 capsule by mouth Every 7 (Seven) Days. 4 capsule 0    Emollient (Lubriderm lotion) lotion Apply  topically to the appropriate area as directed Daily. 117 mL 0    escitalopram (LEXAPRO) 20 MG tablet Take 1 tablet by mouth Daily. 90 tablet 0    famotidine (PEPCID) 20 MG tablet Take 1 tablet by mouth Every Morning. 90 tablet 0     folic acid (FOLVITE) 1 MG tablet Take 1 tablet by mouth Daily.      hydrOXYzine (ATARAX) 25 MG tablet Take 1 tablet by mouth Every 8 (Eight) Hours As Needed for Anxiety. 90 tablet 0    ibuprofen (ADVIL,MOTRIN) 800 MG tablet Take 1 tablet by mouth Every 6 (Six) Hours As Needed for Moderate Pain. 30 tablet 0    lamoTRIgine (LaMICtal) 100 MG tablet Take 1 tablet by mouth Daily. 90 tablet 0    magnesium oxide (MAG-OX) 400 MG tablet Take 1 tablet by mouth Daily.      medroxyPROGESTERone (DEPO-PROVERA) 150 MG/ML injection Inject 1 mL into the appropriate muscle as directed by prescriber Every 3 (Three) Months. 1 mL 2    melatonin 5 MG tablet tablet Take 1 tablet by mouth Every Night. 90 tablet 0    mirtazapine (REMERON) 15 MG tablet Take 1 tablet by mouth Every Night. 90 tablet 0    multivitamin with minerals tablet tablet Take 1 tablet by mouth Daily. 30 tablet 0    Narcan 4 MG/0.1ML nasal spray Administer 1 spray into the nostril(s) as directed by provider As Needed.      omeprazole (priLOSEC) 20 MG capsule Take 1 capsule by mouth Daily.      ondansetron ODT (ZOFRAN-ODT) 4 MG disintegrating tablet Place 1 tablet on the tongue Every 8 (Eight) Hours As Needed.      prazosin (MINIPRESS) 2 MG capsule Take 2 capsules by mouth Every Night. 180 capsule 0    ProAir  (90 Base) MCG/ACT inhaler Inhale 2 puffs Every 6 (Six) Hours As Needed for Wheezing. 18 g 1    promethazine (PHENERGAN) 25 MG tablet Take 1 tablet by mouth Every 6 (Six) Hours As Needed.      propranolol (INDERAL) 20 MG tablet Take 1 tablet by mouth 3 (Three) Times a Day. 270 tablet 0    QUEtiapine (SEROquel) 25 MG tablet Take 1 tablet by mouth Every 12 (Twelve) Hours As Needed (anxiety). 180 tablet 0    QUEtiapine (SEROquel) 300 MG tablet Take 1 tablet by mouth Every Night. 90 tablet 0    rOPINIRole (REQUIP) 1 MG tablet Take 1 tablet by mouth Every Night. 90 tablet 0    Tab-A-Alysia/Iron/Beta Carotene tablet tablet Take 1 tablet by mouth Daily.      thiamine  (VITAMIN B1) 100 MG tablet Take 1 tablet by mouth Daily.      vitamin B-12 (CYANOCOBALAMIN) 1000 MCG tablet Take 1 tablet by mouth Daily.      vitamin B-6 (PYRIDOXINE) 25 MG tablet Take 1 tablet by mouth Daily. 90 tablet 0    Vivitrol 380 MG reconstituted suspension IM suspension Inject 380 mg into the appropriate muscle as directed by prescriber Every 28 (Twenty-Eight) Days.      azithromycin (Zithromax Z-Remy) 250 MG tablet Take 2 tablets by mouth on day 1, then 1 tablet daily on days 2-5 6 tablet 0    medroxyPROGESTERone (Depo-Provera) 150 MG/ML injection Inject 1 mL into the appropriate muscle as directed by prescriber Every 3 (Three) Months. 1 mL 0    promethazine-dextromethorphan (PROMETHAZINE-DM) 6.25-15 MG/5ML syrup Take 5 mL by mouth 4 (Four) Times a Day As Needed for Cough. 240 mL 0     No current facility-administered medications for this visit.     Past Surgical History:   Procedure Laterality Date     SECTION      x 2. 2012, 2013    TUBAL ABDOMINAL LIGATION      WISDOM TOOTH EXTRACTION  2012      Social History     Tobacco Use    Smoking status: Every Day     Current packs/day: 0.00     Types: Cigarettes     Last attempt to quit: 10/16/2021     Years since quitting: 3.0     Passive exposure: Past    Smokeless tobacco: Never   Vaping Use    Vaping status: Every Day    Substances: Nicotine, Flavoring    Devices: Disposable   Substance Use Topics    Alcohol use: Not Currently     Comment: former drinker 2021    Drug use: Yes     Types: Marijuana     Comment: opiods stopped using 2021 - pain meds / marijuana      Family History   Problem Relation Age of Onset    Bipolar disorder Mother     Panic disorder Mother     Mental illness Mother     Other Mother         migraine headaches    Asthma Mother     Migraines Mother     Allergies Mother     Depression Mother     Learning disabilities Mother     Developmental delay Mother     Arthritis Father     Other Father          "migraine headaches    Mental illness Father     Allergies Father     Asthma Father     Learning disabilities Brother     Depression Brother     Allergies Brother     Migraines Brother     Developmental delay Brother     Mental illness Brother     Colon cancer Paternal Grandfather      Health Maintenance Due   Topic Date Due    ANNUAL PHYSICAL  Never done      Immunization History   Administered Date(s) Administered    COVID-19 (PFIZER) Purple Cap Monovalent 10/28/2021, 11/21/2021    Flu Vaccine Quad PF >36MO 11/07/2019    Fluzone (or Fluarix & Flulaval for VFC) >6mos 11/21/2021    Influenza, Unspecified 10/08/2020    Pneumococcal Polysaccharide (PPSV23) 11/22/2021    Tdap 06/13/2017        Objective     Vitals:    10/24/24 1032   BP: 109/53   Pulse: 82   Temp: 99 °F (37.2 °C)   TempSrc: Oral   SpO2: 98%   Weight: 81.1 kg (178 lb 12.8 oz)   Height: 165.1 cm (65\")     Body mass index is 29.75 kg/m².             No results found.    Physical Exam  Vitals reviewed.   Constitutional:       General: She is not in acute distress.     Appearance: Normal appearance. She is well-developed.   HENT:      Head: Normocephalic and atraumatic.      Right Ear: Tympanic membrane and ear canal normal.      Left Ear: Tympanic membrane and ear canal normal.      Nose: Nose normal.      Mouth/Throat:      Mouth: Mucous membranes are moist.   Eyes:      Extraocular Movements: Extraocular movements intact.      Pupils: Pupils are equal, round, and reactive to light.   Cardiovascular:      Rate and Rhythm: Normal rate and regular rhythm.   Pulmonary:      Effort: Pulmonary effort is normal.      Breath sounds: Normal breath sounds.   Neurological:      Mental Status: She is alert and oriented to person, place, and time.   Psychiatric:         Mood and Affect: Mood and affect normal.           Result Review :                               Assessment and Plan      Assessment & Plan  Bronchitis  Will cover her with antibiotics due to smoking " history. Also given rx for cough medication. She has inhalers to use as needed as well.   Generalized anxiety disorder  Reports stable on current treatment. Wishes to continue current treatment regimen. Discussed establishing with psychiatrist outpatient. Declines at this time. Will let me know if she changes her mind.   PTSD (post-traumatic stress disorder)  Continue therapy.   Restless leg syndrome  Medical condition is stable.  Continue same therapy.  Will recheck at next regular appointment    Encounter for surveillance of injectable contraceptive  Rx sent for Depo to be picked up for when due.   Tobacco abuse  Patient has been educated on the risk of diseases from using tobacco products such as cancer, COPD, and heart disease and has been advised to quit. I spent less than 3 minutes counseling the patient.       New Medications Ordered This Visit   Medications    azithromycin (Zithromax Z-Remy) 250 MG tablet     Sig: Take 2 tablets by mouth on day 1, then 1 tablet daily on days 2-5     Dispense:  6 tablet     Refill:  0    promethazine-dextromethorphan (PROMETHAZINE-DM) 6.25-15 MG/5ML syrup     Sig: Take 5 mL by mouth 4 (Four) Times a Day As Needed for Cough.     Dispense:  240 mL     Refill:  0    escitalopram (LEXAPRO) 20 MG tablet     Sig: Take 1 tablet by mouth Daily.     Dispense:  90 tablet     Refill:  0    famotidine (PEPCID) 20 MG tablet     Sig: Take 1 tablet by mouth Every Morning.     Dispense:  90 tablet     Refill:  0    hydrOXYzine (ATARAX) 25 MG tablet     Sig: Take 1 tablet by mouth Every 8 (Eight) Hours As Needed for Anxiety.     Dispense:  90 tablet     Refill:  0    lamoTRIgine (LaMICtal) 100 MG tablet     Sig: Take 1 tablet by mouth Daily.     Dispense:  90 tablet     Refill:  0    mirtazapine (REMERON) 15 MG tablet     Sig: Take 1 tablet by mouth Every Night.     Dispense:  90 tablet     Refill:  0    melatonin 5 MG tablet tablet     Sig: Take 1 tablet by mouth Every Night.     Dispense:   90 tablet     Refill:  0    prazosin (MINIPRESS) 2 MG capsule     Sig: Take 2 capsules by mouth Every Night.     Dispense:  180 capsule     Refill:  0    propranolol (INDERAL) 20 MG tablet     Sig: Take 1 tablet by mouth 3 (Three) Times a Day.     Dispense:  270 tablet     Refill:  0    QUEtiapine (SEROquel) 25 MG tablet     Sig: Take 1 tablet by mouth Every 12 (Twelve) Hours As Needed (anxiety).     Dispense:  180 tablet     Refill:  0    QUEtiapine (SEROquel) 300 MG tablet     Sig: Take 1 tablet by mouth Every Night.     Dispense:  90 tablet     Refill:  0    rOPINIRole (REQUIP) 1 MG tablet     Sig: Take 1 tablet by mouth Every Night.     Dispense:  90 tablet     Refill:  0    vitamin B-6 (PYRIDOXINE) 25 MG tablet     Sig: Take 1 tablet by mouth Daily.     Dispense:  90 tablet     Refill:  0    medroxyPROGESTERone (Depo-Provera) 150 MG/ML injection     Sig: Inject 1 mL into the appropriate muscle as directed by prescriber Every 3 (Three) Months.     Dispense:  1 mL     Refill:  0             Follow Up     Return in about 3 months (around 1/24/2025).

## 2024-11-14 DIAGNOSIS — F43.10 PTSD (POST-TRAUMATIC STRESS DISORDER): ICD-10-CM

## 2024-11-14 DIAGNOSIS — F41.1 GENERALIZED ANXIETY DISORDER: ICD-10-CM

## 2024-11-14 RX ORDER — LANOLIN ALCOHOL/MO/W.PET/CERES
1000 CREAM (GRAM) TOPICAL DAILY
Qty: 90 TABLET | Refills: 1 | Status: SHIPPED | OUTPATIENT
Start: 2024-11-14

## 2024-11-14 RX ORDER — BENZONATATE 100 MG/1
1 CAPSULE, LIQUID FILLED ORAL DAILY
Qty: 90 TABLET | Refills: 1 | Status: SHIPPED | OUTPATIENT
Start: 2024-11-14

## 2024-11-14 RX ORDER — QUETIAPINE FUMARATE 300 MG/1
300 TABLET, FILM COATED ORAL NIGHTLY
Qty: 90 TABLET | Refills: 0 | Status: SHIPPED | OUTPATIENT
Start: 2024-11-14

## 2024-11-14 RX ORDER — MULTIPLE VITAMINS W/ MINERALS TAB 9MG-400MCG
1 TAB ORAL DAILY
Qty: 90 TABLET | Refills: 1 | Status: SHIPPED | OUTPATIENT
Start: 2024-11-14

## 2024-11-14 RX ORDER — LANOLIN ALCOHOL/MO/W.PET/CERES
100 CREAM (GRAM) TOPICAL DAILY
Qty: 90 TABLET | Refills: 1 | Status: SHIPPED | OUTPATIENT
Start: 2024-11-14

## 2024-11-14 NOTE — TELEPHONE ENCOUNTER
Pt states all of her vitamins did not get sent in to the pharmacy when she was in and pharmacy did not receive rx for seroquel 300mg, she will need that one resent. Rxs pended.

## 2024-12-12 DIAGNOSIS — F43.10 PTSD (POST-TRAUMATIC STRESS DISORDER): ICD-10-CM

## 2024-12-12 DIAGNOSIS — F41.1 GENERALIZED ANXIETY DISORDER: ICD-10-CM

## 2024-12-12 RX ORDER — BENZONATATE 100 MG/1
1 CAPSULE, LIQUID FILLED ORAL DAILY
Qty: 90 TABLET | Refills: 1 | Status: SHIPPED | OUTPATIENT
Start: 2024-12-12

## 2024-12-12 RX ORDER — LANOLIN ALCOHOL/MO/W.PET/CERES
1000 CREAM (GRAM) TOPICAL DAILY
Qty: 90 TABLET | Refills: 1 | Status: SHIPPED | OUTPATIENT
Start: 2024-12-12

## 2024-12-12 RX ORDER — LANOLIN ALCOHOL/MO/W.PET/CERES
100 CREAM (GRAM) TOPICAL DAILY
Qty: 90 TABLET | Refills: 1 | Status: SHIPPED | OUTPATIENT
Start: 2024-12-12

## 2024-12-12 RX ORDER — QUETIAPINE FUMARATE 300 MG/1
300 TABLET, FILM COATED ORAL NIGHTLY
Qty: 90 TABLET | Refills: 0 | Status: SHIPPED | OUTPATIENT
Start: 2024-12-12

## 2024-12-12 RX ORDER — MULTIPLE VITAMINS W/ MINERALS TAB 9MG-400MCG
1 TAB ORAL DAILY
Qty: 90 TABLET | Refills: 1 | Status: SHIPPED | OUTPATIENT
Start: 2024-12-12

## 2024-12-12 NOTE — TELEPHONE ENCOUNTER
Caller: Pretty Merritt    Relationship: Self    Best call back number: [phone]     Requested Prescriptions:   Requested Prescriptions     Pending Prescriptions Disp Refills    QUEtiapine (SEROquel) 300 MG tablet 90 tablet 0     Sig: Take 1 tablet by mouth Every Night.    thiamine (VITAMIN B1) 100 MG tablet 90 tablet 1     Sig: Take 1 tablet by mouth Daily.    vitamin B-12 (CYANOCOBALAMIN) 1000 MCG tablet 90 tablet 1     Sig: Take 1 tablet by mouth Daily.    Tab-A-Alysia/Iron/Beta Carotene tablet tablet 90 tablet 1     Sig: Take 1 tablet by mouth Daily.    multivitamin with minerals tablet tablet 90 tablet 1     Sig: Take 1 tablet by mouth Daily.        Pharmacy where request should be sent: [facility] [address] [place] [address] [address] [facility] [phone] [facility] [phone] FX     Last office visit with prescribing clinician: 10/24/2024   Last telemedicine visit with prescribing clinician: Visit date not found   Next office visit with prescribing clinician: 1/24/2025     Additional details provided by patient: PATIENT IS OUT OF MEDICATIONS -- REPORTS THEY WERE SENT TO WRONG PHARMACY -- NEED TO BE RESENT TO LOCAL [facility]    Does the patient have less than a 3 day supply:  [x] Yes  [] No      Afshan Troy Rep   12/12/24 14:19 EST

## 2024-12-31 ENCOUNTER — TELEPHONE (OUTPATIENT)
Dept: FAMILY MEDICINE CLINIC | Age: 34
End: 2024-12-31
Payer: COMMERCIAL

## 2025-01-03 RX ORDER — CHOLECALCIFEROL (VITAMIN D3) 1250 MCG
50000 CAPSULE ORAL WEEKLY
Qty: 12 CAPSULE | Refills: 0 | Status: SHIPPED | OUTPATIENT
Start: 2025-01-03

## 2025-01-07 DIAGNOSIS — Z30.42 ENCOUNTER FOR SURVEILLANCE OF INJECTABLE CONTRACEPTIVE: ICD-10-CM

## 2025-01-07 RX ORDER — MEDROXYPROGESTERONE ACETATE 150 MG/ML
INJECTION, SUSPENSION INTRAMUSCULAR
Qty: 1 ML | Refills: 0 | Status: SHIPPED | OUTPATIENT
Start: 2025-01-07

## 2025-01-07 RX ORDER — HYDROXYZINE HYDROCHLORIDE 25 MG/1
25 TABLET, FILM COATED ORAL EVERY 8 HOURS PRN
Qty: 90 TABLET | Refills: 0 | Status: SHIPPED | OUTPATIENT
Start: 2025-01-07

## 2025-01-07 RX ORDER — IBUPROFEN 800 MG/1
800 TABLET, FILM COATED ORAL EVERY 6 HOURS PRN
Qty: 90 TABLET | Refills: 0 | Status: SHIPPED | OUTPATIENT
Start: 2025-01-07

## 2025-01-07 RX ORDER — FAMOTIDINE 20 MG/1
20 TABLET, FILM COATED ORAL EVERY MORNING
Qty: 90 TABLET | Refills: 0 | Status: SHIPPED | OUTPATIENT
Start: 2025-01-07

## 2025-01-15 RX ORDER — HYDROXYZINE HYDROCHLORIDE 25 MG/1
25 TABLET, FILM COATED ORAL EVERY 8 HOURS PRN
Qty: 90 TABLET | Refills: 0 | Status: SHIPPED | OUTPATIENT
Start: 2025-01-15

## 2025-03-02 DIAGNOSIS — F41.1 GENERALIZED ANXIETY DISORDER: ICD-10-CM

## 2025-03-02 DIAGNOSIS — F43.10 PTSD (POST-TRAUMATIC STRESS DISORDER): ICD-10-CM

## 2025-03-02 DIAGNOSIS — G25.81 RESTLESS LEG SYNDROME: ICD-10-CM

## 2025-03-02 RX ORDER — PROPRANOLOL HCL 20 MG
20 TABLET ORAL 3 TIMES DAILY
Qty: 270 TABLET | Refills: 0 | OUTPATIENT
Start: 2025-03-02

## 2025-03-02 RX ORDER — ESCITALOPRAM OXALATE 20 MG/1
20 TABLET ORAL DAILY
Qty: 90 TABLET | Refills: 0 | OUTPATIENT
Start: 2025-03-02

## 2025-03-02 RX ORDER — ROPINIROLE 1 MG/1
1 TABLET, FILM COATED ORAL NIGHTLY
Qty: 90 TABLET | Refills: 0 | OUTPATIENT
Start: 2025-03-02

## 2025-03-02 RX ORDER — PRAZOSIN HYDROCHLORIDE 2 MG/1
CAPSULE ORAL
Qty: 180 CAPSULE | Refills: 0 | OUTPATIENT
Start: 2025-03-02

## 2025-03-02 RX ORDER — LAMOTRIGINE 100 MG/1
100 TABLET ORAL DAILY
Qty: 90 TABLET | Refills: 0 | OUTPATIENT
Start: 2025-03-02

## 2025-03-12 DIAGNOSIS — G25.81 RESTLESS LEG SYNDROME: ICD-10-CM

## 2025-03-12 RX ORDER — ROPINIROLE 1 MG/1
1 TABLET, FILM COATED ORAL NIGHTLY
Qty: 90 TABLET | Refills: 0 | OUTPATIENT
Start: 2025-03-12

## 2025-04-06 DIAGNOSIS — F41.1 GENERALIZED ANXIETY DISORDER: ICD-10-CM

## 2025-04-06 RX ORDER — QUETIAPINE FUMARATE 25 MG/1
25 TABLET, FILM COATED ORAL EVERY 12 HOURS PRN
Qty: 180 TABLET | Refills: 0 | OUTPATIENT
Start: 2025-04-06

## 2025-05-02 DIAGNOSIS — F41.1 GENERALIZED ANXIETY DISORDER: ICD-10-CM

## 2025-05-02 RX ORDER — QUETIAPINE FUMARATE 25 MG/1
25 TABLET, FILM COATED ORAL EVERY 12 HOURS PRN
Qty: 180 TABLET | Refills: 0 | OUTPATIENT
Start: 2025-05-02

## 2025-05-26 DIAGNOSIS — F41.1 GENERALIZED ANXIETY DISORDER: ICD-10-CM

## 2025-05-26 DIAGNOSIS — F43.10 PTSD (POST-TRAUMATIC STRESS DISORDER): ICD-10-CM

## 2025-05-26 DIAGNOSIS — G25.81 RESTLESS LEG SYNDROME: ICD-10-CM

## 2025-05-26 RX ORDER — ROPINIROLE 1 MG/1
1 TABLET, FILM COATED ORAL NIGHTLY
Qty: 90 TABLET | Refills: 0 | OUTPATIENT
Start: 2025-05-26

## 2025-05-26 RX ORDER — ESCITALOPRAM OXALATE 20 MG/1
20 TABLET ORAL DAILY
Qty: 90 TABLET | Refills: 0 | OUTPATIENT
Start: 2025-05-26

## 2025-05-26 RX ORDER — QUETIAPINE FUMARATE 25 MG/1
25 TABLET, FILM COATED ORAL EVERY 12 HOURS PRN
Qty: 180 TABLET | Refills: 0 | OUTPATIENT
Start: 2025-05-26

## 2025-05-26 RX ORDER — LAMOTRIGINE 100 MG/1
100 TABLET ORAL DAILY
Qty: 90 TABLET | Refills: 0 | OUTPATIENT
Start: 2025-05-26

## 2025-05-26 RX ORDER — PRAZOSIN HYDROCHLORIDE 2 MG/1
CAPSULE ORAL
Qty: 180 CAPSULE | Refills: 0 | OUTPATIENT
Start: 2025-05-26

## 2025-06-18 DIAGNOSIS — F41.1 GENERALIZED ANXIETY DISORDER: ICD-10-CM

## 2025-06-18 RX ORDER — QUETIAPINE FUMARATE 25 MG/1
25 TABLET, FILM COATED ORAL EVERY 12 HOURS PRN
Qty: 180 TABLET | Refills: 0 | OUTPATIENT
Start: 2025-06-18